# Patient Record
Sex: MALE | Race: WHITE | NOT HISPANIC OR LATINO
[De-identification: names, ages, dates, MRNs, and addresses within clinical notes are randomized per-mention and may not be internally consistent; named-entity substitution may affect disease eponyms.]

---

## 2019-11-07 ENCOUNTER — APPOINTMENT (OUTPATIENT)
Dept: CT IMAGING | Facility: CLINIC | Age: 59
End: 2019-11-07

## 2019-11-07 ENCOUNTER — APPOINTMENT (OUTPATIENT)
Dept: RADIOLOGY | Facility: CLINIC | Age: 59
End: 2019-11-07
Payer: COMMERCIAL

## 2019-11-07 PROCEDURE — 74018 RADEX ABDOMEN 1 VIEW: CPT

## 2019-11-07 PROCEDURE — 74176 CT ABD & PELVIS W/O CONTRAST: CPT

## 2020-05-15 ENCOUNTER — EMERGENCY (EMERGENCY)
Facility: HOSPITAL | Age: 60
LOS: 1 days | End: 2020-05-15
Admitting: EMERGENCY MEDICINE
Payer: COMMERCIAL

## 2020-05-15 PROCEDURE — 12001 RPR S/N/AX/GEN/TRNK 2.5CM/<: CPT

## 2020-05-15 PROCEDURE — 73130 X-RAY EXAM OF HAND: CPT | Mod: 26,RT

## 2020-05-15 PROCEDURE — 99284 EMERGENCY DEPT VISIT MOD MDM: CPT | Mod: 25

## 2022-05-19 ENCOUNTER — APPOINTMENT (OUTPATIENT)
Dept: ULTRASOUND IMAGING | Facility: CLINIC | Age: 62
End: 2022-05-19
Payer: COMMERCIAL

## 2022-05-19 PROCEDURE — 93975 VASCULAR STUDY: CPT

## 2022-05-19 PROCEDURE — 76870 US EXAM SCROTUM: CPT

## 2022-11-06 PROBLEM — Z00.00 ENCOUNTER FOR PREVENTIVE HEALTH EXAMINATION: Status: ACTIVE | Noted: 2022-11-06

## 2022-11-09 ENCOUNTER — OUTPATIENT (OUTPATIENT)
Dept: OUTPATIENT SERVICES | Facility: HOSPITAL | Age: 62
LOS: 1 days | End: 2022-11-09
Payer: COMMERCIAL

## 2022-11-09 DIAGNOSIS — I82.409 ACUTE EMBOLISM AND THROMBOSIS OF UNSPECIFIED DEEP VEINS OF UNSPECIFIED LOWER EXTREMITY: ICD-10-CM

## 2022-11-11 ENCOUNTER — APPOINTMENT (OUTPATIENT)
Dept: HEMATOLOGY ONCOLOGY | Facility: CLINIC | Age: 62
End: 2022-11-11

## 2022-11-11 ENCOUNTER — RESULT REVIEW (OUTPATIENT)
Age: 62
End: 2022-11-11

## 2022-11-11 ENCOUNTER — LABORATORY RESULT (OUTPATIENT)
Age: 62
End: 2022-11-11

## 2022-11-11 VITALS
OXYGEN SATURATION: 95 % | BODY MASS INDEX: 25.77 KG/M2 | SYSTOLIC BLOOD PRESSURE: 128 MMHG | HEIGHT: 70 IN | WEIGHT: 180 LBS | HEART RATE: 108 BPM | TEMPERATURE: 97.5 F | DIASTOLIC BLOOD PRESSURE: 80 MMHG

## 2022-11-11 LAB
BASOPHILS # BLD AUTO: 0.06 K/UL — SIGNIFICANT CHANGE UP (ref 0–0.2)
BASOPHILS NFR BLD AUTO: 0.8 % — SIGNIFICANT CHANGE UP (ref 0–2)
EOSINOPHIL # BLD AUTO: 0.16 K/UL — SIGNIFICANT CHANGE UP (ref 0–0.5)
EOSINOPHIL NFR BLD AUTO: 2.2 % — SIGNIFICANT CHANGE UP (ref 0–6)
HCT VFR BLD CALC: 47.8 % — SIGNIFICANT CHANGE UP (ref 39–50)
HGB BLD-MCNC: 16.5 G/DL — SIGNIFICANT CHANGE UP (ref 13–17)
IMM GRANULOCYTES NFR BLD AUTO: 0.6 % — SIGNIFICANT CHANGE UP (ref 0–0.9)
LYMPHOCYTES # BLD AUTO: 1.64 K/UL — SIGNIFICANT CHANGE UP (ref 1–3.3)
LYMPHOCYTES # BLD AUTO: 22.6 % — SIGNIFICANT CHANGE UP (ref 13–44)
MCHC RBC-ENTMCNC: 30.4 PG — SIGNIFICANT CHANGE UP (ref 27–34)
MCHC RBC-ENTMCNC: 34.5 GM/DL — SIGNIFICANT CHANGE UP (ref 32–36)
MCV RBC AUTO: 88 FL — SIGNIFICANT CHANGE UP (ref 80–100)
MONOCYTES # BLD AUTO: 0.62 K/UL — SIGNIFICANT CHANGE UP (ref 0–0.9)
MONOCYTES NFR BLD AUTO: 8.6 % — SIGNIFICANT CHANGE UP (ref 2–14)
NEUTROPHILS # BLD AUTO: 4.73 K/UL — SIGNIFICANT CHANGE UP (ref 1.8–7.4)
NEUTROPHILS NFR BLD AUTO: 65.2 % — SIGNIFICANT CHANGE UP (ref 43–77)
NRBC # BLD: 0 /100 WBCS — SIGNIFICANT CHANGE UP (ref 0–0)
PLATELET # BLD AUTO: 254 K/UL — SIGNIFICANT CHANGE UP (ref 150–400)
RBC # BLD: 5.43 M/UL — SIGNIFICANT CHANGE UP (ref 4.2–5.8)
RBC # FLD: 12.5 % — SIGNIFICANT CHANGE UP (ref 10.3–14.5)
WBC # BLD: 7.25 K/UL — SIGNIFICANT CHANGE UP (ref 3.8–10.5)
WBC # FLD AUTO: 7.25 K/UL — SIGNIFICANT CHANGE UP (ref 3.8–10.5)

## 2022-11-11 PROCEDURE — 99204 OFFICE O/P NEW MOD 45 MIN: CPT

## 2022-11-12 LAB
ALBUMIN SERPL ELPH-MCNC: 4.3 G/DL
ALP BLD-CCNC: 83 U/L
ALT SERPL-CCNC: 46 U/L
ANION GAP SERPL CALC-SCNC: 13 MMOL/L
APTT BLD: 42.2 SEC
AST SERPL-CCNC: 21 U/L
BILIRUB SERPL-MCNC: 0.3 MG/DL
BUN SERPL-MCNC: 20 MG/DL
CALCIUM SERPL-MCNC: 9.7 MG/DL
CANCER AG19-9 SERPL-ACNC: 6 U/ML
CEA SERPL-MCNC: 1.1 NG/ML
CHLORIDE SERPL-SCNC: 105 MMOL/L
CO2 SERPL-SCNC: 22 MMOL/L
CREAT SERPL-MCNC: 0.91 MG/DL
CRP SERPL-MCNC: 4 MG/L
DEPRECATED D DIMER PPP IA-ACNC: 250 NG/ML DDU
EGFR: 96 ML/MIN/1.73M2
ERYTHROCYTE [SEDIMENTATION RATE] IN BLOOD BY WESTERGREN METHOD: 18 MM/HR
FACT VIII ACT/NOR PPP: 165 %
FERRITIN SERPL-MCNC: 320 NG/ML
FOLATE SERPL-MCNC: 11.6 NG/ML
GLUCOSE SERPL-MCNC: 107 MG/DL
INR PPP: 1.54 RATIO
IRON SATN MFR SERPL: 34 %
IRON SERPL-MCNC: 105 UG/DL
POTASSIUM SERPL-SCNC: 4.2 MMOL/L
PROT SERPL-MCNC: 6.7 G/DL
PSA FREE FLD-MCNC: 8 %
PSA FREE SERPL-MCNC: 0.24 NG/ML
PSA SERPL-MCNC: 3.22 NG/ML
PT BLD: 17.9 SEC
SODIUM SERPL-SCNC: 140 MMOL/L
TIBC SERPL-MCNC: 309 UG/DL
UIBC SERPL-MCNC: 203 UG/DL
VIT B12 SERPL-MCNC: 791 PG/ML

## 2022-11-12 NOTE — RESULTS/DATA
[FreeTextEntry1] : Mr. Ashley presented at age 62 in November 2022 for evaluation of newly diagnosed DVT/PE.\par The patient has a medical history of pelvic pain (on amitriptyline). \par \par Unprovoked DVT- pending hypercoagulable workup. \par D-dimer remains elevated. \par Repeat CTA in February 2023. \par Continue xarelto. \par Smoking cessation. \par Rule out underlying malignancy.

## 2022-11-12 NOTE — HISTORY OF PRESENT ILLNESS
[de-identified] : [place] [facility] \par \par Mr. Ashley presented at age 62 in November 2022 for evaluation of newly diagnosed DVT/PE.\par The patient has a medical history of pelvic pain (on amitriptyline). \par \par Davy was recently [place] [facility] [place] for LLE DVT. He initially presented to urgent care with LLE pain x 3 days. Imaging showed LLE DVT and he was referred to ED. CTA revealed a PE with concern for R heart strain. LLE doppler revealed DVT within the L common femoral vein extending to the saphenofemoral junction. He was started on a heparin gtt and transitioned to PO xarelto. TTE was negative for R heart strain. He denied history of blood clots or bleeding disorders, recent long distance travel, immobility, recent procedures. His father had a history of DVT while he had cancer. He was discharged on xarelto 15mg BID followed by 20mg daily. Denies bleeding/bruising. Has some mild SOB on exertion. He feels the L leg soreness is improving. \par \par TTE 11/6/22: EF 55%, normal RV size, function, without cor pulmonale\par LLE Doppler US 11/4/22: LEFT common femoral and central greater saphenous vein DVT\par CTA 11/4/22: PE within lobar, several segmental and subsegmental branches within the left upper lobe and lingula, straightening of the cardiac IV septum suspicious for R heart strain, DVT within the L common femoral vein extends to the saphenofemoral junction\par \par Laboratory studies reviewed at today's visit and notable for: WBC 7.25, Hb 16.5, Plt 254\par D-dimer 250\par ESR, CA 19-9, CEA, CRP, iron studies, CMP, B12, folate wnl \par Ferritin 320 \par PSA 3.22 \par \par HCM: \par - COVID vaccination: s/p 3 doses \par - Colonoscopy: last done in 2022, Dr. Love, repeat in 10 years \par - Lung cancer screen: recent CTA chest did not show any lung masses, should continue lung cancer screening with annual low dose chest CT \par - DEXA: never done \par - PSA: pending urine test \par \par SH: \par - Occupation: works for [employer] in sales \par - Living situation: lives in [place] with her mother and his 23 year old son, has a 25 year old daughter as well \par - Smoking/etoh/illicits: quit smoking 1 week ago, was vaping prior to that, smoked for 30 years 1 ppd, he drinks 2-3 beers / day \par - Exercise: fairly physically active \par \par FH: \par - His father had colon cancer around age 63, prostate, and pancreatic cancer \par - His brother has a brain tumor, age 64

## 2022-11-12 NOTE — PHYSICAL EXAM
[Fully active, able to carry on all pre-disease performance without restriction] : Status 0 - Fully active, able to carry on all pre-disease performance without restriction [Normal] : affect appropriate [de-identified] : generally well appearing male, NAD, pleasant [de-identified] : trace LLE edema

## 2022-11-14 ENCOUNTER — NON-APPOINTMENT (OUTPATIENT)
Age: 62
End: 2022-11-14

## 2022-11-14 LAB
AT III PPP CHRO-ACNC: 127 %
B2 GLYCOPROT1 AB SER QL: NEGATIVE
CARDIOLIPIN AB SER IA-ACNC: POSITIVE

## 2022-11-17 LAB
DNA PLOIDY SPEC FC-IMP: NORMAL
PTR INTERP: NORMAL

## 2022-11-26 ENCOUNTER — TRANSCRIPTION ENCOUNTER (OUTPATIENT)
Age: 62
End: 2022-11-26

## 2022-12-01 ENCOUNTER — RESULT REVIEW (OUTPATIENT)
Age: 62
End: 2022-12-01

## 2022-12-01 ENCOUNTER — APPOINTMENT (OUTPATIENT)
Dept: HEMATOLOGY ONCOLOGY | Facility: CLINIC | Age: 62
End: 2022-12-01

## 2022-12-01 VITALS
DIASTOLIC BLOOD PRESSURE: 86 MMHG | TEMPERATURE: 97.3 F | BODY MASS INDEX: 26.11 KG/M2 | OXYGEN SATURATION: 98 % | HEART RATE: 75 BPM | WEIGHT: 182.39 LBS | SYSTOLIC BLOOD PRESSURE: 134 MMHG | HEIGHT: 70 IN

## 2022-12-01 LAB
BASOPHILS # BLD AUTO: 0.08 K/UL — SIGNIFICANT CHANGE UP (ref 0–0.2)
BASOPHILS NFR BLD AUTO: 1.1 % — SIGNIFICANT CHANGE UP (ref 0–2)
EOSINOPHIL # BLD AUTO: 0.14 K/UL — SIGNIFICANT CHANGE UP (ref 0–0.5)
EOSINOPHIL NFR BLD AUTO: 1.9 % — SIGNIFICANT CHANGE UP (ref 0–6)
HCT VFR BLD CALC: 48.1 % — SIGNIFICANT CHANGE UP (ref 39–50)
HGB BLD-MCNC: 16.2 G/DL — SIGNIFICANT CHANGE UP (ref 13–17)
IMM GRANULOCYTES NFR BLD AUTO: 0.9 % — SIGNIFICANT CHANGE UP (ref 0–0.9)
LYMPHOCYTES # BLD AUTO: 1.78 K/UL — SIGNIFICANT CHANGE UP (ref 1–3.3)
LYMPHOCYTES # BLD AUTO: 23.9 % — SIGNIFICANT CHANGE UP (ref 13–44)
MCHC RBC-ENTMCNC: 30.4 PG — SIGNIFICANT CHANGE UP (ref 27–34)
MCHC RBC-ENTMCNC: 33.7 GM/DL — SIGNIFICANT CHANGE UP (ref 32–36)
MCV RBC AUTO: 90.2 FL — SIGNIFICANT CHANGE UP (ref 80–100)
MONOCYTES # BLD AUTO: 0.69 K/UL — SIGNIFICANT CHANGE UP (ref 0–0.9)
MONOCYTES NFR BLD AUTO: 9.3 % — SIGNIFICANT CHANGE UP (ref 2–14)
NEUTROPHILS # BLD AUTO: 4.68 K/UL — SIGNIFICANT CHANGE UP (ref 1.8–7.4)
NEUTROPHILS NFR BLD AUTO: 62.9 % — SIGNIFICANT CHANGE UP (ref 43–77)
NRBC # BLD: 0 /100 WBCS — SIGNIFICANT CHANGE UP (ref 0–0)
PLATELET # BLD AUTO: 257 K/UL — SIGNIFICANT CHANGE UP (ref 150–400)
RBC # BLD: 5.33 M/UL — SIGNIFICANT CHANGE UP (ref 4.2–5.8)
RBC # FLD: 12.6 % — SIGNIFICANT CHANGE UP (ref 10.3–14.5)
WBC # BLD: 7.44 K/UL — SIGNIFICANT CHANGE UP (ref 3.8–10.5)
WBC # FLD AUTO: 7.44 K/UL — SIGNIFICANT CHANGE UP (ref 3.8–10.5)

## 2022-12-01 PROCEDURE — 85027 COMPLETE CBC AUTOMATED: CPT

## 2022-12-01 PROCEDURE — 99214 OFFICE O/P EST MOD 30 MIN: CPT

## 2022-12-01 RX ORDER — RIVAROXABAN 15 MG/1
15 TABLET, FILM COATED ORAL
Qty: 40 | Refills: 0 | Status: DISCONTINUED | COMMUNITY
Start: 2022-11-07 | End: 2022-12-01

## 2022-12-01 NOTE — HISTORY OF PRESENT ILLNESS
[de-identified] : Referred by: [facility] \par \par Mr. Ashley presented at age 62 in November 2022 for evaluation of newly diagnosed DVT/PE.\par The patient has a medical history of pelvic pain (on amitriptyline). \par \par Presenting HPI: Davy was recently admitted to [facility] from 11/4-11/7 for LLE DVT. He initially presented to urgent care with LLE pain x 3 days. Imaging showed LLE DVT and he was referred to ED. CTA revealed a PE with concern for R heart strain. LLE doppler revealed DVT within the L common femoral vein extending to the saphenofemoral junction. He was started on a heparin gtt and transitioned to PO xarelto. TTE was negative for R heart strain. He denied history of blood clots or bleeding disorders, recent long distance travel, immobility, recent procedures. His father had a history of DVT while he had cancer. He was discharged on xarelto 15mg BID followed by 20mg daily. Denies bleeding/bruising. Has some mild SOB on exertion. He feels the L leg soreness is improving. \par \par TTE 11/6/22: EF 55%, normal RV size, function, without cor pulmonale\par LLE Doppler US 11/4/22: LEFT common femoral and central greater saphenous vein DVT\par CTA 11/4/22: PE within lobar, several segmental and subsegmental branches within the left upper lobe and lingula, straightening of the cardiac IV septum suspicious for R heart strain, DVT within the L common femoral vein extends to the saphenofemoral junction\par \par Laboratory studies reviewed at today's visit and notable for: WBC 7.25, Hb 16.5, Plt 254\par D-dimer 250\par ESR, CA 19-9, CEA, CRP, iron studies, CMP, B12, folate wnl \par Ferritin 320 \par PSA 3.22 \par \par HCM: \par - COVID vaccination: s/p 3 doses \par - Colonoscopy: last done in 2022, Dr. Love, repeat in 10 years \par - Lung cancer screen: recent CTA chest did not show any lung masses, should continue lung cancer screening with annual low dose chest CT \par - DEXA: never done \par - PSA: pending urine test \par \par SH: \par - Occupation: works for [employer] in sales \par - Living situation: lives in [place] with her mother and his 23 year old son, has a 25 year old daughter as well \par - Smoking/etoh/illicits: quit smoking 1 week ago, was vaping prior to that, smoked for 30 years 1 ppd, he drinks 2-3 beers / day \par - Exercise: fairly physically active \par \par FH: \par - His father had colon cancer around age 63, prostate, and pancreatic cancer \par - His brother has a brain tumor, age 64  [de-identified] : Davy presents for follow up on 12/1/22 for PE/DVT. \par \par Laboratory studies 11/11/22 reviewed: WBC 7.25, Hb 16.5, Plt 254\par D-dimer 250, ESR, CA 19-9, CEA, CRP, iron studies, CMP, B12, folate wnl \par Ferritin 320, PSA 3.22 \par Cardiolipin AB POSITIVE, Cardiolipin IgA wnl, IgG wnl, IgM 116 (H) POSITIVE, Lupus AC POSITIVE, B2 G screen negative \par ATIII activity normal, Factor V leiden, prothrombin gene mutation normal \par \par At today's visit he is generally feeling well. He remains on xarelto 20mg PO daily. He denies any bleeding, bruising, blood in his urine/stools. He denies any breathing difficulties. \par \par Laboratory studies reviewed at today's visit and notable for: WBC 7.44, Hb 16.2, Plt 257 \par

## 2022-12-01 NOTE — RESULTS/DATA
[FreeTextEntry1] : Mr. Ashley presented at age 62 in November 2022 for evaluation of newly diagnosed DVT/PE.\par The patient has a medical history of pelvic pain (on amitriptyline). \par \par Unprovoked DVT- Cardiolipin AB POSITIVE, Cardiolipin IgA wnl, IgG wnl, IgM 116 (H) POSITIVE, Lupus AC POSITIVE, B2 G screen negative \par ATIII activity normal, Factor V leiden, prothrombin gene mutation normal \par D-dimer remains elevated.  \par Continue xarelto. \par Smoking cessation. \par Repeat APLS testing in February 2023 and repeat CTA Feb 2023.

## 2022-12-01 NOTE — PHYSICAL EXAM
[Fully active, able to carry on all pre-disease performance without restriction] : Status 0 - Fully active, able to carry on all pre-disease performance without restriction [Normal] : affect appropriate [de-identified] : generally well appearing male, NAD, pleasant [de-identified] : trace LLE edema

## 2022-12-02 LAB — DEPRECATED D DIMER PPP IA-ACNC: <150 NG/ML DDU

## 2022-12-28 ENCOUNTER — NON-APPOINTMENT (OUTPATIENT)
Age: 62
End: 2022-12-28

## 2023-02-09 ENCOUNTER — NON-APPOINTMENT (OUTPATIENT)
Age: 63
End: 2023-02-09

## 2023-02-15 ENCOUNTER — OUTPATIENT (OUTPATIENT)
Dept: OUTPATIENT SERVICES | Facility: HOSPITAL | Age: 63
LOS: 1 days | End: 2023-02-15
Payer: COMMERCIAL

## 2023-02-15 DIAGNOSIS — I82.409 ACUTE EMBOLISM AND THROMBOSIS OF UNSPECIFIED DEEP VEINS OF UNSPECIFIED LOWER EXTREMITY: ICD-10-CM

## 2023-02-23 ENCOUNTER — LABORATORY RESULT (OUTPATIENT)
Age: 63
End: 2023-02-23

## 2023-02-23 ENCOUNTER — APPOINTMENT (OUTPATIENT)
Dept: CT IMAGING | Facility: CLINIC | Age: 63
End: 2023-02-23
Payer: COMMERCIAL

## 2023-02-23 ENCOUNTER — RESULT REVIEW (OUTPATIENT)
Age: 63
End: 2023-02-23

## 2023-02-23 ENCOUNTER — APPOINTMENT (OUTPATIENT)
Dept: HEMATOLOGY ONCOLOGY | Facility: CLINIC | Age: 63
End: 2023-02-23
Payer: COMMERCIAL

## 2023-02-23 VITALS
HEIGHT: 70 IN | OXYGEN SATURATION: 95 % | HEART RATE: 78 BPM | DIASTOLIC BLOOD PRESSURE: 82 MMHG | WEIGHT: 185.4 LBS | SYSTOLIC BLOOD PRESSURE: 123 MMHG | BODY MASS INDEX: 26.54 KG/M2 | TEMPERATURE: 97.3 F

## 2023-02-23 LAB
BASOPHILS # BLD AUTO: 0.07 K/UL — SIGNIFICANT CHANGE UP (ref 0–0.2)
BASOPHILS NFR BLD AUTO: 1.2 % — SIGNIFICANT CHANGE UP (ref 0–2)
EOSINOPHIL # BLD AUTO: 0.22 K/UL — SIGNIFICANT CHANGE UP (ref 0–0.5)
EOSINOPHIL NFR BLD AUTO: 3.8 % — SIGNIFICANT CHANGE UP (ref 0–6)
HCT VFR BLD CALC: 47.2 % — SIGNIFICANT CHANGE UP (ref 39–50)
HGB BLD-MCNC: 16 G/DL — SIGNIFICANT CHANGE UP (ref 13–17)
IMM GRANULOCYTES NFR BLD AUTO: 0.5 % — SIGNIFICANT CHANGE UP (ref 0–0.9)
LYMPHOCYTES # BLD AUTO: 1.97 K/UL — SIGNIFICANT CHANGE UP (ref 1–3.3)
LYMPHOCYTES # BLD AUTO: 33.6 % — SIGNIFICANT CHANGE UP (ref 13–44)
MCHC RBC-ENTMCNC: 29.5 PG — SIGNIFICANT CHANGE UP (ref 27–34)
MCHC RBC-ENTMCNC: 33.9 GM/DL — SIGNIFICANT CHANGE UP (ref 32–36)
MCV RBC AUTO: 87.1 FL — SIGNIFICANT CHANGE UP (ref 80–100)
MONOCYTES # BLD AUTO: 0.55 K/UL — SIGNIFICANT CHANGE UP (ref 0–0.9)
MONOCYTES NFR BLD AUTO: 9.4 % — SIGNIFICANT CHANGE UP (ref 2–14)
NEUTROPHILS # BLD AUTO: 3.02 K/UL — SIGNIFICANT CHANGE UP (ref 1.8–7.4)
NEUTROPHILS NFR BLD AUTO: 51.5 % — SIGNIFICANT CHANGE UP (ref 43–77)
NRBC # BLD: 0 /100 WBCS — SIGNIFICANT CHANGE UP (ref 0–0)
PLATELET # BLD AUTO: 244 K/UL — SIGNIFICANT CHANGE UP (ref 150–400)
RBC # BLD: 5.42 M/UL — SIGNIFICANT CHANGE UP (ref 4.2–5.8)
RBC # FLD: 12.4 % — SIGNIFICANT CHANGE UP (ref 10.3–14.5)
WBC # BLD: 5.86 K/UL — SIGNIFICANT CHANGE UP (ref 3.8–10.5)
WBC # FLD AUTO: 5.86 K/UL — SIGNIFICANT CHANGE UP (ref 3.8–10.5)

## 2023-02-23 PROCEDURE — 99214 OFFICE O/P EST MOD 30 MIN: CPT

## 2023-02-23 PROCEDURE — 71275 CT ANGIOGRAPHY CHEST: CPT

## 2023-02-23 NOTE — PHYSICAL EXAM
[Fully active, able to carry on all pre-disease performance without restriction] : Status 0 - Fully active, able to carry on all pre-disease performance without restriction [Normal] : affect appropriate [de-identified] : generally well appearing male, NAD, pleasant [de-identified] : Soft, non-tender, non-distended [de-identified] : No LE edema

## 2023-02-23 NOTE — HISTORY OF PRESENT ILLNESS
[de-identified] : [place] [facility] \par \par Mr. Ashley initially presented at age 62 in November 2022 for evaluation of newly diagnosed DVT/PE.\par The patient has a medical history of pelvic pain (on amitriptyline). \par \par Presenting HPI: Davy was [place] [facility] [place] for LLE DVT. He initially presented to urgent care with LLE pain x 3 days. Imaging showed LLE DVT and he was referred to ED. CTA revealed a PE with concern for R heart strain. LLE doppler revealed DVT within the L common femoral vein extending to the saphenofemoral junction. He was started on a heparin gtt and transitioned to PO xarelto. TTE was negative for R heart strain. He denied history of blood clots or bleeding disorders, recent long distance travel, immobility, recent procedures. His father had a history of DVT while he had cancer. He was discharged on xarelto 15mg BID followed by 20mg daily. Denies bleeding/bruising. Has some mild SOB on exertion. He feels the L leg soreness is improving. \par \par TTE 11/6/22: EF 55%, normal RV size, function, without cor pulmonale\par LLE Doppler US 11/4/22: LEFT common femoral and central greater saphenous vein DVT\par CTA 11/4/22: PE within lobar, several segmental and subsegmental branches within the left upper lobe and lingula, straightening of the cardiac IV septum suspicious for R heart strain, DVT within the L common femoral vein extends to the saphenofemoral junction\par \par Laboratory studies reviewed at today's visit and notable for: WBC 7.25, Hb 16.5, Plt 254\par D-dimer 250\par ESR, CA 19-9, CEA, CRP, iron studies, CMP, B12, folate wnl \par Ferritin 320 \par PSA 3.22 \par \par HCM: \par - COVID vaccination: s/p 3 doses \par - Colonoscopy: last done in 2022, Dr. Love, repeat in 10 years \par - Lung cancer screen: recent CTA chest did not show any lung masses, should continue lung cancer screening with annual low dose chest CT \par - DEXA: never done \par - PSA: pending urine test \par \par SH: \par - Occupation: works for [employer] in sales \par - Living situation: lives in [place] with her mother and his 23 year old son, has a 25 year old daughter as well \par - Smoking/etoh/illicits: quit smoking 1 week ago, was vaping prior to that, smoked for 30 years 1 ppd, he drinks 2-3 beers / day \par - Exercise: fairly physically active \par \par FH: \par - His father had colon cancer around age 63, prostate, and pancreatic cancer \par - His brother has a brain tumor, age 64  [de-identified] : Davy presents for follow up on 2/23/23 for PE/DVT. \par \par Laboratory studies 11/11/22 reviewed: WBC 7.25, Hb 16.5, Plt 254\par D-dimer 250, ESR, CA 19-9, CEA, CRP, iron studies, CMP, B12, folate wnl \par Ferritin 320, PSA 3.22 \par Cardiolipin AB POSITIVE, Cardiolipin IgA wnl, IgG wnl, IgM 116 (H) POSITIVE, Lupus AC POSITIVE, B2 G screen negative \par ATIII activity normal, Factor V leiden, prothrombin gene mutation normal \par \par At today's visit he reports ongoing fatigue for the past year.  Has occasional headaches which began 9/22 - no associated neurologic symptoms.  He continues Xarelto 20mg PO daily. He denies any bleeding, bruising, blood in his urine/stools.  Still with mild SOB since 11/22.  Remains tobacco free since diagnosis of PE/DVT.  Scheduled for prostate biopsy 3/22.\par \par Laboratory studies reviewed at today's visit and notable for: WBC 5.86, Hb 16.0, Plt 244 \par

## 2023-02-23 NOTE — RESULTS/DATA
[FreeTextEntry1] : Mr. Ashley initially presented at age 62 in November 2022 for evaluation of newly diagnosed DVT/PE.\par The patient has a medical history of pelvic pain (on amitriptyline). \par \par Unprovoked DVT- Cardiolipin AB POSITIVE, Cardiolipin IgA wnl, IgG wnl, IgM 116 (H) POSITIVE, Lupus AC POSITIVE, B2 G screen negative \par ATIII activity normal, Factor V leiden, prothrombin gene mutation normal \par D-dimer remains elevated.  \par Continue xarelto. \par Smoked 1 ppd x 50 yrs - quit 11/22\par Repeat APLS testing today - results pending\par Repeat CTA Feb 2023\par Prostate biopsy scheduled 3/22\par Follow up with Dr. Tellez in 5/23, sooner prn

## 2023-02-24 LAB
ALBUMIN SERPL ELPH-MCNC: 4.2 G/DL
ALP BLD-CCNC: 69 U/L
ALT SERPL-CCNC: 34 U/L
ANION GAP SERPL CALC-SCNC: 12 MMOL/L
AST SERPL-CCNC: 23 U/L
BILIRUB SERPL-MCNC: 0.4 MG/DL
BUN SERPL-MCNC: 12 MG/DL
CALCIUM SERPL-MCNC: 9.8 MG/DL
CHLORIDE SERPL-SCNC: 105 MMOL/L
CO2 SERPL-SCNC: 24 MMOL/L
CREAT SERPL-MCNC: 0.81 MG/DL
EGFR: 100 ML/MIN/1.73M2
GLUCOSE SERPL-MCNC: 110 MG/DL
POTASSIUM SERPL-SCNC: 4.4 MMOL/L
PROT SERPL-MCNC: 6.5 G/DL
SODIUM SERPL-SCNC: 141 MMOL/L

## 2023-02-28 ENCOUNTER — NON-APPOINTMENT (OUTPATIENT)
Age: 63
End: 2023-02-28

## 2023-02-28 LAB
B2 GLYCOPROT1 AB SER QL: NEGATIVE
CARDIOLIPIN AB SER IA-ACNC: POSITIVE

## 2023-02-28 RX ORDER — RIVAROXABAN 20 MG/1
20 TABLET, FILM COATED ORAL DAILY
Qty: 30 | Refills: 5 | Status: DISCONTINUED | COMMUNITY
Start: 2022-11-11 | End: 2023-02-28

## 2023-03-09 ENCOUNTER — NON-APPOINTMENT (OUTPATIENT)
Age: 63
End: 2023-03-09

## 2023-03-23 ENCOUNTER — RESULT REVIEW (OUTPATIENT)
Age: 63
End: 2023-03-23

## 2023-03-23 ENCOUNTER — LABORATORY RESULT (OUTPATIENT)
Age: 63
End: 2023-03-23

## 2023-03-23 ENCOUNTER — APPOINTMENT (OUTPATIENT)
Dept: HEMATOLOGY ONCOLOGY | Facility: CLINIC | Age: 63
End: 2023-03-23

## 2023-03-23 LAB
BASOPHILS # BLD AUTO: 0.06 K/UL — SIGNIFICANT CHANGE UP (ref 0–0.2)
BASOPHILS NFR BLD AUTO: 0.8 % — SIGNIFICANT CHANGE UP (ref 0–2)
EOSINOPHIL # BLD AUTO: 0.24 K/UL — SIGNIFICANT CHANGE UP (ref 0–0.5)
EOSINOPHIL NFR BLD AUTO: 3.3 % — SIGNIFICANT CHANGE UP (ref 0–6)
HCT VFR BLD CALC: 48.7 % — SIGNIFICANT CHANGE UP (ref 39–50)
HGB BLD-MCNC: 16.4 G/DL — SIGNIFICANT CHANGE UP (ref 13–17)
IMM GRANULOCYTES NFR BLD AUTO: 0.3 % — SIGNIFICANT CHANGE UP (ref 0–0.9)
LYMPHOCYTES # BLD AUTO: 1.81 K/UL — SIGNIFICANT CHANGE UP (ref 1–3.3)
LYMPHOCYTES # BLD AUTO: 25.2 % — SIGNIFICANT CHANGE UP (ref 13–44)
MCHC RBC-ENTMCNC: 29.4 PG — SIGNIFICANT CHANGE UP (ref 27–34)
MCHC RBC-ENTMCNC: 33.7 GM/DL — SIGNIFICANT CHANGE UP (ref 32–36)
MCV RBC AUTO: 87.4 FL — SIGNIFICANT CHANGE UP (ref 80–100)
MONOCYTES # BLD AUTO: 0.74 K/UL — SIGNIFICANT CHANGE UP (ref 0–0.9)
MONOCYTES NFR BLD AUTO: 10.3 % — SIGNIFICANT CHANGE UP (ref 2–14)
NEUTROPHILS # BLD AUTO: 4.32 K/UL — SIGNIFICANT CHANGE UP (ref 1.8–7.4)
NEUTROPHILS NFR BLD AUTO: 60.1 % — SIGNIFICANT CHANGE UP (ref 43–77)
NRBC # BLD: 0 /100 WBCS — SIGNIFICANT CHANGE UP (ref 0–0)
PLATELET # BLD AUTO: 254 K/UL — SIGNIFICANT CHANGE UP (ref 150–400)
RBC # BLD: 5.57 M/UL — SIGNIFICANT CHANGE UP (ref 4.2–5.8)
RBC # FLD: 12.7 % — SIGNIFICANT CHANGE UP (ref 10.3–14.5)
WBC # BLD: 7.19 K/UL — SIGNIFICANT CHANGE UP (ref 3.8–10.5)
WBC # FLD AUTO: 7.19 K/UL — SIGNIFICANT CHANGE UP (ref 3.8–10.5)

## 2023-03-23 PROCEDURE — 85027 COMPLETE CBC AUTOMATED: CPT

## 2023-03-23 PROCEDURE — 36415 COLL VENOUS BLD VENIPUNCTURE: CPT

## 2023-03-24 LAB
ALBUMIN SERPL ELPH-MCNC: 4.4 G/DL
ALP BLD-CCNC: 77 U/L
ALT SERPL-CCNC: 33 U/L
ANA PAT FLD IF-IMP: NORMAL
ANA SER IF-ACNC: ABNORMAL
ANION GAP SERPL CALC-SCNC: 14 MMOL/L
APTT BLD: 33 SEC
AST SERPL-CCNC: 22 U/L
B2 GLYCOPROT1 AB SER QL: NEGATIVE
BILIRUB SERPL-MCNC: 0.4 MG/DL
BUN SERPL-MCNC: 15 MG/DL
CALCIUM SERPL-MCNC: 9.7 MG/DL
CHLORIDE SERPL-SCNC: 104 MMOL/L
CO2 SERPL-SCNC: 20 MMOL/L
CONFIRM: 32.9 SEC
CREAT SERPL-MCNC: 1.12 MG/DL
DRVVT IMM 1:2 NP PPP: ABNORMAL
DRVVT SCREEN TO CONFIRM RATIO: 1.58 RATIO
EGFR: 75 ML/MIN/1.73M2
GLUCOSE SERPL-MCNC: 104 MG/DL
POTASSIUM SERPL-SCNC: 4.4 MMOL/L
PROT SERPL-MCNC: 6.9 G/DL
SCREEN DRVVT: 61.4 SEC
SILICA CLOTTING TIME INTERPRETATION: ABNORMAL
SILICA CLOTTING TIME S/C: 1.26 RATIO
SODIUM SERPL-SCNC: 138 MMOL/L

## 2023-03-27 LAB — CARDIOLIPIN AB SER IA-ACNC: POSITIVE

## 2023-03-30 ENCOUNTER — NON-APPOINTMENT (OUTPATIENT)
Age: 63
End: 2023-03-30

## 2023-05-08 ENCOUNTER — OUTPATIENT (OUTPATIENT)
Dept: OUTPATIENT SERVICES | Facility: HOSPITAL | Age: 63
LOS: 1 days | End: 2023-05-08
Payer: COMMERCIAL

## 2023-05-08 DIAGNOSIS — I82.409 ACUTE EMBOLISM AND THROMBOSIS OF UNSPECIFIED DEEP VEINS OF UNSPECIFIED LOWER EXTREMITY: ICD-10-CM

## 2023-05-15 ENCOUNTER — APPOINTMENT (OUTPATIENT)
Dept: HEMATOLOGY ONCOLOGY | Facility: CLINIC | Age: 63
End: 2023-05-15
Payer: COMMERCIAL

## 2023-05-15 ENCOUNTER — RESULT REVIEW (OUTPATIENT)
Age: 63
End: 2023-05-15

## 2023-05-15 ENCOUNTER — LABORATORY RESULT (OUTPATIENT)
Age: 63
End: 2023-05-15

## 2023-05-15 VITALS
BODY MASS INDEX: 26.92 KG/M2 | SYSTOLIC BLOOD PRESSURE: 120 MMHG | DIASTOLIC BLOOD PRESSURE: 81 MMHG | HEART RATE: 92 BPM | TEMPERATURE: 97.9 F | OXYGEN SATURATION: 95 % | HEIGHT: 70 IN | WEIGHT: 188 LBS

## 2023-05-15 LAB
BASOPHILS # BLD AUTO: 0.08 K/UL — SIGNIFICANT CHANGE UP (ref 0–0.2)
BASOPHILS NFR BLD AUTO: 1.3 % — SIGNIFICANT CHANGE UP (ref 0–2)
EOSINOPHIL # BLD AUTO: 0.23 K/UL — SIGNIFICANT CHANGE UP (ref 0–0.5)
EOSINOPHIL NFR BLD AUTO: 3.6 % — SIGNIFICANT CHANGE UP (ref 0–6)
HCT VFR BLD CALC: 48.2 % — SIGNIFICANT CHANGE UP (ref 39–50)
HGB BLD-MCNC: 16.1 G/DL — SIGNIFICANT CHANGE UP (ref 13–17)
IMM GRANULOCYTES NFR BLD AUTO: 0.3 % — SIGNIFICANT CHANGE UP (ref 0–0.9)
LYMPHOCYTES # BLD AUTO: 1.67 K/UL — SIGNIFICANT CHANGE UP (ref 1–3.3)
LYMPHOCYTES # BLD AUTO: 26.2 % — SIGNIFICANT CHANGE UP (ref 13–44)
MCHC RBC-ENTMCNC: 29.3 PG — SIGNIFICANT CHANGE UP (ref 27–34)
MCHC RBC-ENTMCNC: 33.4 GM/DL — SIGNIFICANT CHANGE UP (ref 32–36)
MCV RBC AUTO: 87.6 FL — SIGNIFICANT CHANGE UP (ref 80–100)
MONOCYTES # BLD AUTO: 0.57 K/UL — SIGNIFICANT CHANGE UP (ref 0–0.9)
MONOCYTES NFR BLD AUTO: 8.9 % — SIGNIFICANT CHANGE UP (ref 2–14)
NEUTROPHILS # BLD AUTO: 3.8 K/UL — SIGNIFICANT CHANGE UP (ref 1.8–7.4)
NEUTROPHILS NFR BLD AUTO: 59.7 % — SIGNIFICANT CHANGE UP (ref 43–77)
NRBC # BLD: 0 /100 WBCS — SIGNIFICANT CHANGE UP (ref 0–0)
PLATELET # BLD AUTO: 264 K/UL — SIGNIFICANT CHANGE UP (ref 150–400)
RBC # BLD: 5.5 M/UL — SIGNIFICANT CHANGE UP (ref 4.2–5.8)
RBC # FLD: 12.7 % — SIGNIFICANT CHANGE UP (ref 10.3–14.5)
WBC # BLD: 6.37 K/UL — SIGNIFICANT CHANGE UP (ref 3.8–10.5)
WBC # FLD AUTO: 6.37 K/UL — SIGNIFICANT CHANGE UP (ref 3.8–10.5)

## 2023-05-15 PROCEDURE — 99214 OFFICE O/P EST MOD 30 MIN: CPT

## 2023-05-15 NOTE — HISTORY OF PRESENT ILLNESS
[de-identified] : Referred by: [facility] \par \par Mr. Ashley presented at age 62 in November 2022 for evaluation of newly diagnosed DVT/PE.\par The patient has a medical history of pelvic pain (on amitriptyline). \par \par Presenting HPI: Davy was recently admitted [place] [facility] [place]11[place] for LLE DVT. He initially presented to urgent care with LLE pain x 3 days. Imaging showed LLE DVT and he was referred to ED. CTA revealed a PE with concern for R heart strain. LLE doppler revealed DVT within the L common femoral vein extending to the saphenofemoral junction. He was started on a heparin gtt and transitioned to PO xarelto. TTE was negative for R heart strain. He denied history of blood clots or bleeding disorders, recent long distance travel, immobility, recent procedures. His father had a history of DVT while he had cancer. He was discharged on xarelto 15mg BID followed by 20mg daily. Denies bleeding/bruising. Has some mild SOB on exertion. He feels the L leg soreness is improving. \par \par TTE 11/6/22: EF 55%, normal RV size, function, without cor pulmonale\par LLE Doppler US 11/4/22: LEFT common femoral and central greater saphenous vein DVT\par CTA 11/4/22: PE within lobar, several segmental and subsegmental branches within the left upper lobe and lingula, straightening of the cardiac IV septum suspicious for R heart strain, DVT within the L common femoral vein extends to the saphenofemoral junction\par \par Laboratory studies reviewed at today's visit and notable for: WBC 7.25, Hb 16.5, Plt 254\par D-dimer 250\par ESR, CA 19-9, CEA, CRP, iron studies, CMP, B12, folate wnl \par Ferritin 320 \par PSA 3.22 \par \par HCM: \par - COVID vaccination: s/p 3 doses \par - Colonoscopy: last done in 2022, Dr. Love, repeat in 10 years \par - Lung cancer screen: recent CTA chest did not show any lung masses, should continue lung cancer screening with annual low dose chest CT \par - DEXA: never done \par - PSA: pending urine test \par \par SH: \par - Occupation: works for [employer] in sales \par - Living situation: lives in [place] with her mother and his 23 year old son, has a 25 year old daughter as well \par - Smoking/etoh/illicits: quit smoking 1 week ago, was vaping prior to that, smoked for 30 years 1 ppd, he drinks 2-3 beers / day \par - Exercise: fairly physically active \par \par FH: \par - His father had colon cancer around age 63, prostate, and pancreatic cancer \par - His brother has a brain tumor, age 64  [de-identified] : Davy presents for follow up on 5/15/23 for PE/DVT. \par Cardiolipin AB POSITIVE, Cardiolipin IgA wnl, IgG wnl, IgM 116 (H) POSITIVE, Lupus AC POSITIVE, B2 G screen negative \par Factor V leiden, prothrombin gene mutation normal \par \par At today's visit he states he is not doing well. He was recently diagnosed with an early stage prostate cancer- jose francisco 7. He is following at [facility] and is pending prostatectomy w/ Dr. Li ([facility]) in the near future. He has not yet seen a medical oncologist. He decreased his xarelto dose from 20mg to 10mg in 2023. He also bumped his L shin and developed a hematoma- doppler US in 2023 was negative for DVT and showed resolution of prior DVT.  He also states that his brother  10 days ago from brain cancer. Davy is feeling very exhausted and fatigued. He denies any bleeding, bruising, blood in his urine/stools. He denies any breathing difficulties. \par \par Laboratory studies reviewed at today's visit and notable for: WBC 6.37, Hb 16.1, Plt 264

## 2023-05-15 NOTE — PHYSICAL EXAM
[Fully active, able to carry on all pre-disease performance without restriction] : Status 0 - Fully active, able to carry on all pre-disease performance without restriction [Normal] : affect appropriate [de-identified] : generally well appearing male, NAD, pleasant [de-identified] : lump on LLE shin

## 2023-05-15 NOTE — RESULTS/DATA
[FreeTextEntry1] : Mr. Ashley presented at age 62 in November 2022 for evaluation of newly diagnosed DVT/PE.\par The patient has a medical history of pelvic pain (on amitriptyline). \par \par Unprovoked DVT- Cardiolipin AB POSITIVE, Cardiolipin IgA wnl, IgG wnl, IgM 116 (H) POSITIVE, Lupus AC POSITIVE, B2 G screen negative \par ATIII activity normal, Factor V leiden, prothrombin gene mutation normal \par Intermediate risk APLS. \par On xarelto 10mg daily. Prior clot resolved. \par Pending prostatectomy for early stage prostate cancer, jose francisco 7. \par Will obtain records and follow up final pathology. \par Eulalia-operative instructions given. \par He is medically optimized for surgery. \par F/U 6 weeks.

## 2023-05-16 LAB
ALBUMIN SERPL ELPH-MCNC: 4 G/DL
ALP BLD-CCNC: 71 U/L
ALT SERPL-CCNC: 30 U/L
ANA SER IF-ACNC: NEGATIVE
ANION GAP SERPL CALC-SCNC: 13 MMOL/L
AST SERPL-CCNC: 20 U/L
BILIRUB SERPL-MCNC: 0.3 MG/DL
BUN SERPL-MCNC: 16 MG/DL
CALCIUM SERPL-MCNC: 9.3 MG/DL
CHLORIDE SERPL-SCNC: 106 MMOL/L
CO2 SERPL-SCNC: 24 MMOL/L
CREAT SERPL-MCNC: 0.92 MG/DL
EGFR: 95 ML/MIN/1.73M2
GLUCOSE SERPL-MCNC: 89 MG/DL
POTASSIUM SERPL-SCNC: 4.3 MMOL/L
PROT SERPL-MCNC: 6.3 G/DL
SODIUM SERPL-SCNC: 142 MMOL/L

## 2023-05-25 ENCOUNTER — NON-APPOINTMENT (OUTPATIENT)
Age: 63
End: 2023-05-25

## 2023-06-14 ENCOUNTER — NON-APPOINTMENT (OUTPATIENT)
Age: 63
End: 2023-06-14

## 2023-06-14 RX ORDER — RIVAROXABAN 10 MG/1
10 TABLET, FILM COATED ORAL
Qty: 90 | Refills: 1 | Status: DISCONTINUED | COMMUNITY
Start: 2023-02-28 | End: 2023-06-14

## 2023-06-22 ENCOUNTER — NON-APPOINTMENT (OUTPATIENT)
Age: 63
End: 2023-06-22

## 2023-06-23 ENCOUNTER — RESULT REVIEW (OUTPATIENT)
Age: 63
End: 2023-06-23

## 2023-06-23 ENCOUNTER — APPOINTMENT (OUTPATIENT)
Dept: HEMATOLOGY ONCOLOGY | Facility: CLINIC | Age: 63
End: 2023-06-23

## 2023-06-23 LAB
BASOPHILS # BLD AUTO: 0.06 K/UL — SIGNIFICANT CHANGE UP (ref 0–0.2)
BASOPHILS NFR BLD AUTO: 0.9 % — SIGNIFICANT CHANGE UP (ref 0–2)
EOSINOPHIL # BLD AUTO: 0.27 K/UL — SIGNIFICANT CHANGE UP (ref 0–0.5)
EOSINOPHIL NFR BLD AUTO: 4 % — SIGNIFICANT CHANGE UP (ref 0–6)
HCT VFR BLD CALC: 39.8 % — SIGNIFICANT CHANGE UP (ref 39–50)
HGB BLD-MCNC: 13.5 G/DL — SIGNIFICANT CHANGE UP (ref 13–17)
IMM GRANULOCYTES NFR BLD AUTO: 1.2 % — HIGH (ref 0–0.9)
LYMPHOCYTES # BLD AUTO: 1.78 K/UL — SIGNIFICANT CHANGE UP (ref 1–3.3)
LYMPHOCYTES # BLD AUTO: 26.2 % — SIGNIFICANT CHANGE UP (ref 13–44)
MCHC RBC-ENTMCNC: 29.5 PG — SIGNIFICANT CHANGE UP (ref 27–34)
MCHC RBC-ENTMCNC: 33.9 GM/DL — SIGNIFICANT CHANGE UP (ref 32–36)
MCV RBC AUTO: 87.1 FL — SIGNIFICANT CHANGE UP (ref 80–100)
MONOCYTES # BLD AUTO: 0.6 K/UL — SIGNIFICANT CHANGE UP (ref 0–0.9)
MONOCYTES NFR BLD AUTO: 8.8 % — SIGNIFICANT CHANGE UP (ref 2–14)
NEUTROPHILS # BLD AUTO: 4.01 K/UL — SIGNIFICANT CHANGE UP (ref 1.8–7.4)
NEUTROPHILS NFR BLD AUTO: 58.9 % — SIGNIFICANT CHANGE UP (ref 43–77)
NRBC # BLD: 0 /100 WBCS — SIGNIFICANT CHANGE UP (ref 0–0)
PLATELET # BLD AUTO: 224 K/UL — SIGNIFICANT CHANGE UP (ref 150–400)
RBC # BLD: 4.57 M/UL — SIGNIFICANT CHANGE UP (ref 4.2–5.8)
RBC # FLD: 13.1 % — SIGNIFICANT CHANGE UP (ref 10.3–14.5)
WBC # BLD: 6.8 K/UL — SIGNIFICANT CHANGE UP (ref 3.8–10.5)
WBC # FLD AUTO: 6.8 K/UL — SIGNIFICANT CHANGE UP (ref 3.8–10.5)

## 2023-06-23 PROCEDURE — 85027 COMPLETE CBC AUTOMATED: CPT

## 2023-06-30 ENCOUNTER — APPOINTMENT (OUTPATIENT)
Dept: HEMATOLOGY ONCOLOGY | Facility: CLINIC | Age: 63
End: 2023-06-30
Payer: COMMERCIAL

## 2023-06-30 VITALS
TEMPERATURE: 97.5 F | HEIGHT: 70 IN | WEIGHT: 173 LBS | BODY MASS INDEX: 24.77 KG/M2 | HEART RATE: 91 BPM | SYSTOLIC BLOOD PRESSURE: 109 MMHG | DIASTOLIC BLOOD PRESSURE: 73 MMHG | OXYGEN SATURATION: 93 %

## 2023-06-30 PROCEDURE — 99215 OFFICE O/P EST HI 40 MIN: CPT

## 2023-07-05 ENCOUNTER — NON-APPOINTMENT (OUTPATIENT)
Age: 63
End: 2023-07-05

## 2023-07-09 NOTE — HISTORY OF PRESENT ILLNESS
[de-identified] : Referred by: [facility] \par \par Mr. Ashley presented at age 62 in November 2022 for evaluation of newly diagnosed DVT/PE.\par The patient has a medical history of pelvic pain (on amitriptyline). \par \par Presenting HPI: Davy was recently admitted [place] [facility] [place] for LLE DVT. He initially presented to urgent care with LLE pain x 3 days. Imaging showed LLE DVT and he was referred to ED. CTA revealed a PE with concern for R heart strain. LLE doppler revealed DVT within the L common femoral vein extending to the saphenofemoral junction. He was started on a heparin gtt and transitioned to PO xarelto. TTE was negative for R heart strain. He denied history of blood clots or bleeding disorders, recent long distance travel, immobility, recent procedures. His father had a history of DVT while he had cancer. He was discharged on xarelto 15mg BID followed by 20mg daily. Denies bleeding/bruising. Has some mild SOB on exertion. He feels the L leg soreness is improving. \par \par TTE 11/6/22: EF 55%, normal RV size, function, without cor pulmonale\par LLE Doppler US 11/4/22: LEFT common femoral and central greater saphenous vein DVT\par CTA 11/4/22: PE within lobar, several segmental and subsegmental branches within the left upper lobe and lingula, straightening of the cardiac IV septum suspicious for R heart strain, DVT within the L common femoral vein extends to the saphenofemoral junction\par \par Laboratory studies reviewed at today's visit and notable for: WBC 7.25, Hb 16.5, Plt 254\par D-dimer 250\par ESR, CA 19-9, CEA, CRP, iron studies, CMP, B12, folate wnl \par Ferritin 320 \par PSA 3.22 \par \par HCM: \par - COVID vaccination: s/p 3 doses \par - Colonoscopy: last done in 2022, Dr. Love, repeat in 10 years \par - Lung cancer screen: recent CTA chest did not show any lung masses, should continue lung cancer screening with annual low dose chest CT \par - DEXA: never done \par - PSA: pending urine test \par \par SH: \par - Occupation: works for [employer] in sales \par - Living situation: lives in [place] with her mother and his 23 year old son, has a 25 year old daughter as well \par - Smoking/etoh/illicits: quit smoking 1 week ago, was vaping prior to that, smoked for 30 years 1 ppd, he drinks 2-3 beers / day \par - Exercise: fairly physically active \par \par FH: \par - His father had colon cancer around age 63, prostate, and pancreatic cancer \par - His brother has a brain tumor, age 64  [de-identified] : Davy presents for follow up on 6/30/23 for PE/DVT, newly diagnosed prostate cancer, s/p prostatectomy. \par Cardiolipin AB POSITIVE, Cardiolipin IgA wnl, IgG wnl, IgM 116 (H) POSITIVE, Lupus AC POSITIVE, B2 G screen negative \par Factor V leiden, prothrombin gene mutation normal \par \par Since the prior visit, Davy underwent prostatectomy with Dr. Beck ([facility]) on 6/19/23 which revealed prostatic adenocarcinoma, Jose Francisco score 3+4=7 involving approximately 10% of the tissue examined, extra prostatic invasion was present, 5 lymph nodes on the left and right side were negative for malignancy, PNI is identified. pT3a (extraprostatic extension), N0, Mx. He has restarted Xarelto at 20 mg daily to complete 1 month and then will decrease back to 10 mg daily.  At today's visit he continues to have some postsurgical pain.  He denies any bleeding, bruising, blood in his urine/stools. He denies any breathing difficulties. \par \par PSA 4.28 (5/17/23) \par \par Prostate adenocarcinoma- T3a,N0, PSA <10, grade group 2 (jose francisco 3+4=7), overall Stage IIIB\par NCCN High Risk 2/2 T3a (extraprostatic extension) \par

## 2023-07-09 NOTE — PHYSICAL EXAM
[Fully active, able to carry on all pre-disease performance without restriction] : Status 0 - Fully active, able to carry on all pre-disease performance without restriction [Normal] : affect appropriate [de-identified] : lump on LLE shin  [de-identified] : generally well appearing male, NAD, pleasant

## 2023-07-09 NOTE — RESULTS/DATA
[FreeTextEntry1] : Mr. Ashley presented at age 62 in November 2022 for evaluation of newly diagnosed DVT/PE.\par The patient has a medical history of pelvic pain (on amitriptyline). \par \par Unprovoked DVT- Cardiolipin AB POSITIVE, Cardiolipin IgA wnl, IgG wnl, IgM 116 (H) POSITIVE, Lupus AC POSITIVE, B2 G screen negative \par ATIII activity normal, Factor V leiden, prothrombin gene mutation normal \par Intermediate risk APLS. \par Now found to have Prostate adenocarcinoma- T3a,N0, PSA <10, grade group 2 (jose francisco 3+4=7), overall Stage IIIB\par NCCN High Risk 2/2 T3a (extraprostatic extension) \par S/p prostatectomy at [facility] on 6/19/23. \par Pending post-op PSA due August 2023. \par If undetectable, can monitor patient and consider salvage RT if PSA rises. \par If detectable post-op PSA, can consider adjuvant RT and short course of salvage ADT. \par Restarted on full dose AC after surgery. Can likely decrease back to low dose ~1 month after surgery. \par All patient questions answered at today's visit. Patient urged to call the office with any questions or concerns. \par

## 2023-07-10 ENCOUNTER — NON-APPOINTMENT (OUTPATIENT)
Age: 63
End: 2023-07-10

## 2023-07-31 RX ORDER — RIVAROXABAN 20 MG/1
20 TABLET, FILM COATED ORAL
Qty: 60 | Refills: 1 | Status: DISCONTINUED | COMMUNITY
Start: 2023-06-14 | End: 2023-07-31

## 2023-08-01 ENCOUNTER — OUTPATIENT (OUTPATIENT)
Dept: OUTPATIENT SERVICES | Facility: HOSPITAL | Age: 63
LOS: 1 days | End: 2023-08-01
Payer: COMMERCIAL

## 2023-08-01 DIAGNOSIS — I82.409 ACUTE EMBOLISM AND THROMBOSIS OF UNSPECIFIED DEEP VEINS OF UNSPECIFIED LOWER EXTREMITY: ICD-10-CM

## 2023-08-04 ENCOUNTER — RESULT REVIEW (OUTPATIENT)
Age: 63
End: 2023-08-04

## 2023-08-04 ENCOUNTER — APPOINTMENT (OUTPATIENT)
Dept: HEMATOLOGY ONCOLOGY | Facility: CLINIC | Age: 63
End: 2023-08-04
Payer: COMMERCIAL

## 2023-08-04 VITALS
OXYGEN SATURATION: 95 % | SYSTOLIC BLOOD PRESSURE: 102 MMHG | WEIGHT: 173 LBS | BODY MASS INDEX: 24.77 KG/M2 | TEMPERATURE: 98.4 F | DIASTOLIC BLOOD PRESSURE: 67 MMHG | HEART RATE: 83 BPM | RESPIRATION RATE: 16 BRPM | HEIGHT: 70 IN

## 2023-08-04 LAB
BASOPHILS # BLD AUTO: 0.08 K/UL — SIGNIFICANT CHANGE UP (ref 0–0.2)
BASOPHILS NFR BLD AUTO: 1 % — SIGNIFICANT CHANGE UP (ref 0–2)
EOSINOPHIL # BLD AUTO: 0.2 K/UL — SIGNIFICANT CHANGE UP (ref 0–0.5)
EOSINOPHIL NFR BLD AUTO: 2.5 % — SIGNIFICANT CHANGE UP (ref 0–6)
HCT VFR BLD CALC: 42 % — SIGNIFICANT CHANGE UP (ref 39–50)
HGB BLD-MCNC: 13.8 G/DL — SIGNIFICANT CHANGE UP (ref 13–17)
IMM GRANULOCYTES NFR BLD AUTO: 0.4 % — SIGNIFICANT CHANGE UP (ref 0–0.9)
LYMPHOCYTES # BLD AUTO: 2.04 K/UL — SIGNIFICANT CHANGE UP (ref 1–3.3)
LYMPHOCYTES # BLD AUTO: 25.9 % — SIGNIFICANT CHANGE UP (ref 13–44)
MCHC RBC-ENTMCNC: 28.4 PG — SIGNIFICANT CHANGE UP (ref 27–34)
MCHC RBC-ENTMCNC: 32.9 GM/DL — SIGNIFICANT CHANGE UP (ref 32–36)
MCV RBC AUTO: 86.4 FL — SIGNIFICANT CHANGE UP (ref 80–100)
MONOCYTES # BLD AUTO: 0.78 K/UL — SIGNIFICANT CHANGE UP (ref 0–0.9)
MONOCYTES NFR BLD AUTO: 9.9 % — SIGNIFICANT CHANGE UP (ref 2–14)
NEUTROPHILS # BLD AUTO: 4.75 K/UL — SIGNIFICANT CHANGE UP (ref 1.8–7.4)
NEUTROPHILS NFR BLD AUTO: 60.3 % — SIGNIFICANT CHANGE UP (ref 43–77)
NRBC # BLD: 0 /100 WBCS — SIGNIFICANT CHANGE UP (ref 0–0)
PLATELET # BLD AUTO: 321 K/UL — SIGNIFICANT CHANGE UP (ref 150–400)
RBC # BLD: 4.86 M/UL — SIGNIFICANT CHANGE UP (ref 4.2–5.8)
RBC # FLD: 13.2 % — SIGNIFICANT CHANGE UP (ref 10.3–14.5)
WBC # BLD: 7.88 K/UL — SIGNIFICANT CHANGE UP (ref 3.8–10.5)
WBC # FLD AUTO: 7.88 K/UL — SIGNIFICANT CHANGE UP (ref 3.8–10.5)

## 2023-08-04 PROCEDURE — 85027 COMPLETE CBC AUTOMATED: CPT

## 2023-08-04 PROCEDURE — 99214 OFFICE O/P EST MOD 30 MIN: CPT

## 2023-08-04 NOTE — PHYSICAL EXAM
[Fully active, able to carry on all pre-disease performance without restriction] : Status 0 - Fully active, able to carry on all pre-disease performance without restriction [Normal] : affect appropriate [de-identified] : generally well appearing male, NAD, pleasant [de-identified] : lump on LLE shin

## 2023-08-04 NOTE — RESULTS/DATA
[FreeTextEntry1] : Mr. Ashley presented at age 62 in November 2022 for evaluation of newly diagnosed DVT/PE. The patient has a medical history of pelvic pain (on amitriptyline).   Unprovoked DVT- Cardiolipin AB POSITIVE, Cardiolipin IgA wnl, IgG wnl, IgM 116 (H) POSITIVE, Lupus AC POSITIVE, B2 G screen negative  ATIII activity normal, Factor V leiden, prothrombin gene mutation normal  Intermediate risk APLS.  Now found to have Prostate adenocarcinoma- T3a,N0, PSA <10, grade group 2 (jose francisco 3+4=7), overall Stage IIIB NCCN High Risk 2/2 T3a (extraprostatic extension)  S/p prostatectomy at [facility] on 6/19/23.  Pending post-op PSA due August 2023.  If undetectable, can monitor patient and consider salvage RT if PSA rises.  If detectable post-op PSA, can consider adjuvant RT and short course of salvage ADT.  Decreased to xarelto 10mg given hematuria. Hb stable today.  All patient questions answered at today's visit. Patient urged to call the office with any questions or concerns.

## 2023-08-04 NOTE — HISTORY OF PRESENT ILLNESS
[de-identified] : [place]: [facility]  Mr. Ashley presented at age 62 in November 2022 for evaluation of newly diagnosed DVT/PE. The patient has a medical history of pelvic pain (on amitriptyline).  Presenting HPI: Davy was recently [place] [facility] [place] for LLE DVT. He initially presented to urgent care with LLE pain x 3 days. Imaging showed LLE DVT and he was referred to ED. CTA revealed a PE with concern for R heart strain. LLE doppler revealed DVT within the L common femoral vein extending to the saphenofemoral junction. He was started on a heparin gtt and transitioned to PO xarelto. TTE was negative for R heart strain. He denied history of blood clots or bleeding disorders, recent long distance travel, immobility, recent procedures. His father had a history of DVT while he had cancer. He was discharged on xarelto 15mg BID followed by 20mg daily. Denies bleeding/bruising. Has some mild SOB on exertion. He feels the L leg soreness is improving.  TTE 11/6/22: EF 55%, normal RV size, function, without cor pulmonale LLE Doppler US 11/4/22: LEFT common femoral and central greater saphenous vein DVT CTA 11/4/22: PE within lobar, several segmental and subsegmental branches within the left upper lobe and lingula, straightening of the cardiac IV septum suspicious for R heart strain, DVT within the L common femoral vein extends to the saphenofemoral junction  Laboratory studies reviewed at today's visit and notable for: WBC 7.25, Hb 16.5, Plt 254 D-dimer 250 ESR, CA 19-9, CEA, CRP, iron studies, CMP, B12, folate wnl Ferritin 320 PSA 3.22  HCM: - COVID vaccination: s/p 3 doses - Colonoscopy: last done in 2022, Dr. Love, repeat in 10 years - Lung cancer screen: recent CTA chest did not show any lung masses, should continue lung cancer screening with annual low dose chest CT - DEXA: never done  SH: - Occupation: works for [employer] in sales - Living situation: lives in [place] with her mother and his 23 year old son, has a 25 year old daughter as well - Smoking/etoh/illicits: quit smoking 1 week ago, was vaping prior to that, smoked for 30 years 1 ppd, he drinks 2-3 beers / day - Exercise: fairly physically active  FH: - His father had colon cancer around age 63, prostate, and pancreatic cancer - His brother has a brain tumor, age 64. [de-identified] : Davy presents for follow up on 6/30/23 for PE/DVT, newly diagnosed prostate cancer, s/p prostatectomy.  Cardiolipin AB POSITIVE, Cardiolipin IgA wnl, IgG wnl, IgM 116 (H) POSITIVE, Lupus AC POSITIVE, B2 G screen negative  Factor V leiden, prothrombin gene mutation normal  S/p prostatectomy with Dr. Beck ([place]) on 6/19/23 which revealed prostatic adenocarcinoma, Bickmore score 3+4=7 involving approximately 10% of the tissue examined, extra prostatic invasion was present, LN 0/5, PNI is identified. pT3a (extraprostatic extension), N0, Mx.   At today's visit he reports significant hematuria over the last 5.5 weeks. He decreased his xarelto from 20mg to 10mg and believes this helped somewhat. He has a follow up appointment w/ Dr. Beck next Thursday. He has met w/ radiation oncology at [facility]. He denies fevers, chills, CP, SOB, n/v/d, dizziness. He does have some lower abdominal tenderness.   PSA 4.28 (5/17/23)   Prostate adenocarcinoma- T3a,N0, PSA <10, grade group 2 (jose francisco 3+4=7), overall Stage IIIB NCCN High Risk 2/2 T3a (extraprostatic extension)   Laboratory studies reviewed at today's visit and notable for: WBC 7.88, Hb 13.8, plt 321

## 2023-08-07 LAB
ALBUMIN SERPL ELPH-MCNC: 4.2 G/DL
ALP BLD-CCNC: 92 U/L
ALT SERPL-CCNC: 30 U/L
ANION GAP SERPL CALC-SCNC: 12 MMOL/L
AST SERPL-CCNC: 22 U/L
BILIRUB SERPL-MCNC: 0.2 MG/DL
BUN SERPL-MCNC: 16 MG/DL
CALCIUM SERPL-MCNC: 9.2 MG/DL
CHLORIDE SERPL-SCNC: 105 MMOL/L
CO2 SERPL-SCNC: 22 MMOL/L
CREAT SERPL-MCNC: 0.87 MG/DL
EGFR: 98 ML/MIN/1.73M2
GLUCOSE SERPL-MCNC: 99 MG/DL
POTASSIUM SERPL-SCNC: 4.2 MMOL/L
PROT SERPL-MCNC: 6.4 G/DL
PSA FREE FLD-MCNC: NORMAL %
PSA FREE SERPL-MCNC: <0.01 NG/ML
PSA SERPL-MCNC: <0.01 NG/ML
SODIUM SERPL-SCNC: 139 MMOL/L

## 2023-08-09 ENCOUNTER — NON-APPOINTMENT (OUTPATIENT)
Age: 63
End: 2023-08-09

## 2023-09-08 NOTE — END OF VISIT
[Time Spent: ___ minutes] : I have spent [unfilled] minutes of time on the encounter.
PAST MEDICAL HISTORY:  Cystitides, interstitial, chronic     HTN (hypertension)

## 2023-10-03 ENCOUNTER — NON-APPOINTMENT (OUTPATIENT)
Age: 63
End: 2023-10-03

## 2023-10-04 ENCOUNTER — NON-APPOINTMENT (OUTPATIENT)
Age: 63
End: 2023-10-04

## 2023-10-04 ENCOUNTER — APPOINTMENT (OUTPATIENT)
Dept: UROLOGY | Facility: CLINIC | Age: 63
End: 2023-10-04
Payer: COMMERCIAL

## 2023-10-04 ENCOUNTER — LABORATORY RESULT (OUTPATIENT)
Age: 63
End: 2023-10-04

## 2023-10-04 VITALS
HEIGHT: 70 IN | TEMPERATURE: 97.3 F | WEIGHT: 173 LBS | DIASTOLIC BLOOD PRESSURE: 71 MMHG | BODY MASS INDEX: 24.77 KG/M2 | SYSTOLIC BLOOD PRESSURE: 114 MMHG | HEART RATE: 67 BPM

## 2023-10-04 DIAGNOSIS — Z80.42 FAMILY HISTORY OF MALIGNANT NEOPLASM OF PROSTATE: ICD-10-CM

## 2023-10-04 DIAGNOSIS — Z80.0 FAMILY HISTORY OF MALIGNANT NEOPLASM OF DIGESTIVE ORGANS: ICD-10-CM

## 2023-10-04 DIAGNOSIS — Z80.8 FAMILY HISTORY OF MALIGNANT NEOPLASM OF OTHER ORGANS OR SYSTEMS: ICD-10-CM

## 2023-10-04 PROCEDURE — 99204 OFFICE O/P NEW MOD 45 MIN: CPT

## 2023-10-04 RX ORDER — AMITRIPTYLINE HYDROCHLORIDE 10 MG/1
10 TABLET, FILM COATED ORAL
Qty: 60 | Refills: 0 | Status: DISCONTINUED | COMMUNITY
Start: 2022-10-13 | End: 2023-10-04

## 2023-10-04 RX ORDER — TADALAFIL 5 MG/1
TABLET, FILM COATED ORAL
Refills: 0 | Status: ACTIVE | COMMUNITY

## 2023-10-05 LAB
APPEARANCE: CLEAR
BILIRUBIN URINE: NEGATIVE
BLOOD URINE: NEGATIVE
COLOR: YELLOW
GLUCOSE QUALITATIVE U: NEGATIVE MG/DL
KETONES URINE: NEGATIVE MG/DL
LEUKOCYTE ESTERASE URINE: ABNORMAL
NITRITE URINE: NEGATIVE
PH URINE: 6
PROTEIN URINE: NEGATIVE MG/DL
SPECIFIC GRAVITY URINE: 1.02
UROBILINOGEN URINE: 0.2 MG/DL

## 2023-10-08 DIAGNOSIS — N39.0 URINARY TRACT INFECTION, SITE NOT SPECIFIED: ICD-10-CM

## 2023-10-16 ENCOUNTER — APPOINTMENT (OUTPATIENT)
Dept: UROLOGY | Facility: CLINIC | Age: 63
End: 2023-10-16
Payer: COMMERCIAL

## 2023-10-16 VITALS
SYSTOLIC BLOOD PRESSURE: 128 MMHG | WEIGHT: 173 LBS | HEIGHT: 70 IN | HEART RATE: 79 BPM | DIASTOLIC BLOOD PRESSURE: 77 MMHG | TEMPERATURE: 97.9 F | BODY MASS INDEX: 24.77 KG/M2

## 2023-10-16 DIAGNOSIS — N39.46 MIXED INCONTINENCE: ICD-10-CM

## 2023-10-16 LAB
BILIRUB UR QL STRIP: NEGATIVE
CLARITY UR: CLEAR
COLLECTION METHOD: NORMAL
GLUCOSE UR-MCNC: NEGATIVE
HCG UR QL: 0.2 EU/DL
HGB UR QL STRIP.AUTO: NORMAL
KETONES UR-MCNC: NEGATIVE
LEUKOCYTE ESTERASE UR QL STRIP: NEGATIVE
NITRITE UR QL STRIP: NEGATIVE
PH UR STRIP: 5.5
PROT UR STRIP-MCNC: NEGATIVE
SP GR UR STRIP: 1.02

## 2023-10-16 PROCEDURE — 81003 URINALYSIS AUTO W/O SCOPE: CPT | Mod: QW

## 2023-10-16 PROCEDURE — 52000 CYSTOURETHROSCOPY: CPT

## 2023-10-17 DIAGNOSIS — N39.3 STRESS INCONTINENCE (FEMALE) (MALE): ICD-10-CM

## 2023-10-27 ENCOUNTER — OUTPATIENT (OUTPATIENT)
Dept: OUTPATIENT SERVICES | Facility: HOSPITAL | Age: 63
LOS: 1 days | End: 2023-10-27
Payer: COMMERCIAL

## 2023-10-27 DIAGNOSIS — I82.409 ACUTE EMBOLISM AND THROMBOSIS OF UNSPECIFIED DEEP VEINS OF UNSPECIFIED LOWER EXTREMITY: ICD-10-CM

## 2023-10-27 DIAGNOSIS — D64.9 ANEMIA, UNSPECIFIED: ICD-10-CM

## 2023-11-03 ENCOUNTER — RESULT REVIEW (OUTPATIENT)
Age: 63
End: 2023-11-03

## 2023-11-03 ENCOUNTER — APPOINTMENT (OUTPATIENT)
Dept: HEMATOLOGY ONCOLOGY | Facility: CLINIC | Age: 63
End: 2023-11-03
Payer: COMMERCIAL

## 2023-11-03 VITALS
BODY MASS INDEX: 25.91 KG/M2 | HEIGHT: 70 IN | RESPIRATION RATE: 16 BRPM | DIASTOLIC BLOOD PRESSURE: 81 MMHG | WEIGHT: 181 LBS | OXYGEN SATURATION: 94 % | HEART RATE: 88 BPM | SYSTOLIC BLOOD PRESSURE: 127 MMHG | TEMPERATURE: 97.9 F

## 2023-11-03 DIAGNOSIS — N52.1 ERECTILE DYSFUNCTION DUE TO DISEASES CLASSIFIED ELSEWHERE: ICD-10-CM

## 2023-11-03 DIAGNOSIS — N52.9 MALE ERECTILE DYSFUNCTION, UNSPECIFIED: ICD-10-CM

## 2023-11-03 LAB
BASOPHILS # BLD AUTO: 0.07 K/UL — SIGNIFICANT CHANGE UP (ref 0–0.2)
BASOPHILS # BLD AUTO: 0.07 K/UL — SIGNIFICANT CHANGE UP (ref 0–0.2)
BASOPHILS NFR BLD AUTO: 1.1 % — SIGNIFICANT CHANGE UP (ref 0–2)
BASOPHILS NFR BLD AUTO: 1.1 % — SIGNIFICANT CHANGE UP (ref 0–2)
EOSINOPHIL # BLD AUTO: 0.3 K/UL — SIGNIFICANT CHANGE UP (ref 0–0.5)
EOSINOPHIL # BLD AUTO: 0.3 K/UL — SIGNIFICANT CHANGE UP (ref 0–0.5)
EOSINOPHIL NFR BLD AUTO: 4.8 % — SIGNIFICANT CHANGE UP (ref 0–6)
EOSINOPHIL NFR BLD AUTO: 4.8 % — SIGNIFICANT CHANGE UP (ref 0–6)
HCT VFR BLD CALC: 46.7 % — SIGNIFICANT CHANGE UP (ref 39–50)
HCT VFR BLD CALC: 46.7 % — SIGNIFICANT CHANGE UP (ref 39–50)
HGB BLD-MCNC: 15.4 G/DL — SIGNIFICANT CHANGE UP (ref 13–17)
HGB BLD-MCNC: 15.4 G/DL — SIGNIFICANT CHANGE UP (ref 13–17)
IMM GRANULOCYTES NFR BLD AUTO: 0.3 % — SIGNIFICANT CHANGE UP (ref 0–0.9)
IMM GRANULOCYTES NFR BLD AUTO: 0.3 % — SIGNIFICANT CHANGE UP (ref 0–0.9)
LYMPHOCYTES # BLD AUTO: 1.83 K/UL — SIGNIFICANT CHANGE UP (ref 1–3.3)
LYMPHOCYTES # BLD AUTO: 1.83 K/UL — SIGNIFICANT CHANGE UP (ref 1–3.3)
LYMPHOCYTES # BLD AUTO: 29.2 % — SIGNIFICANT CHANGE UP (ref 13–44)
LYMPHOCYTES # BLD AUTO: 29.2 % — SIGNIFICANT CHANGE UP (ref 13–44)
MCHC RBC-ENTMCNC: 27.9 PG — SIGNIFICANT CHANGE UP (ref 27–34)
MCHC RBC-ENTMCNC: 27.9 PG — SIGNIFICANT CHANGE UP (ref 27–34)
MCHC RBC-ENTMCNC: 33 GM/DL — SIGNIFICANT CHANGE UP (ref 32–36)
MCHC RBC-ENTMCNC: 33 GM/DL — SIGNIFICANT CHANGE UP (ref 32–36)
MCV RBC AUTO: 84.8 FL — SIGNIFICANT CHANGE UP (ref 80–100)
MCV RBC AUTO: 84.8 FL — SIGNIFICANT CHANGE UP (ref 80–100)
MONOCYTES # BLD AUTO: 0.6 K/UL — SIGNIFICANT CHANGE UP (ref 0–0.9)
MONOCYTES # BLD AUTO: 0.6 K/UL — SIGNIFICANT CHANGE UP (ref 0–0.9)
MONOCYTES NFR BLD AUTO: 9.6 % — SIGNIFICANT CHANGE UP (ref 2–14)
MONOCYTES NFR BLD AUTO: 9.6 % — SIGNIFICANT CHANGE UP (ref 2–14)
NEUTROPHILS # BLD AUTO: 3.44 K/UL — SIGNIFICANT CHANGE UP (ref 1.8–7.4)
NEUTROPHILS # BLD AUTO: 3.44 K/UL — SIGNIFICANT CHANGE UP (ref 1.8–7.4)
NEUTROPHILS NFR BLD AUTO: 55 % — SIGNIFICANT CHANGE UP (ref 43–77)
NEUTROPHILS NFR BLD AUTO: 55 % — SIGNIFICANT CHANGE UP (ref 43–77)
NRBC # BLD: 0 /100 WBCS — SIGNIFICANT CHANGE UP (ref 0–0)
NRBC # BLD: 0 /100 WBCS — SIGNIFICANT CHANGE UP (ref 0–0)
PLATELET # BLD AUTO: 266 K/UL — SIGNIFICANT CHANGE UP (ref 150–400)
PLATELET # BLD AUTO: 266 K/UL — SIGNIFICANT CHANGE UP (ref 150–400)
RBC # BLD: 5.51 M/UL — SIGNIFICANT CHANGE UP (ref 4.2–5.8)
RBC # BLD: 5.51 M/UL — SIGNIFICANT CHANGE UP (ref 4.2–5.8)
RBC # FLD: 13.7 % — SIGNIFICANT CHANGE UP (ref 10.3–14.5)
RBC # FLD: 13.7 % — SIGNIFICANT CHANGE UP (ref 10.3–14.5)
WBC # BLD: 6.26 K/UL — SIGNIFICANT CHANGE UP (ref 3.8–10.5)
WBC # BLD: 6.26 K/UL — SIGNIFICANT CHANGE UP (ref 3.8–10.5)
WBC # FLD AUTO: 6.26 K/UL — SIGNIFICANT CHANGE UP (ref 3.8–10.5)
WBC # FLD AUTO: 6.26 K/UL — SIGNIFICANT CHANGE UP (ref 3.8–10.5)

## 2023-11-03 PROCEDURE — 99214 OFFICE O/P EST MOD 30 MIN: CPT

## 2023-11-03 PROCEDURE — 85027 COMPLETE CBC AUTOMATED: CPT

## 2023-11-03 RX ORDER — CEPHALEXIN 500 MG/1
500 TABLET ORAL
Qty: 20 | Refills: 0 | Status: DISCONTINUED | COMMUNITY
Start: 2023-10-08 | End: 2023-11-03

## 2023-11-03 RX ORDER — TADALAFIL 5 MG/1
5 TABLET ORAL
Qty: 90 | Refills: 2 | Status: ACTIVE | COMMUNITY
Start: 2023-11-03 | End: 1900-01-01

## 2023-11-03 RX ORDER — TADALAFIL 20 MG/1
20 TABLET, FILM COATED ORAL
Qty: 30 | Refills: 1 | Status: ACTIVE | COMMUNITY
Start: 2023-11-03 | End: 1900-01-01

## 2023-11-05 PROBLEM — N52.9 ERECTILE DYSFUNCTION: Status: ACTIVE | Noted: 2023-11-03

## 2023-11-05 PROBLEM — N52.1 ERECTILE DYSFUNCTION DUE TO DISEASES CLASSIFIED ELSEWHERE: Status: ACTIVE | Noted: 2023-11-03

## 2023-11-06 LAB
ALBUMIN SERPL ELPH-MCNC: 4.4 G/DL
ALP BLD-CCNC: 77 U/L
ALT SERPL-CCNC: 23 U/L
ANION GAP SERPL CALC-SCNC: 15 MMOL/L
AST SERPL-CCNC: 17 U/L
BILIRUB SERPL-MCNC: 0.2 MG/DL
BUN SERPL-MCNC: 15 MG/DL
CALCIUM SERPL-MCNC: 9.4 MG/DL
CHLORIDE SERPL-SCNC: 106 MMOL/L
CO2 SERPL-SCNC: 25 MMOL/L
CREAT SERPL-MCNC: 0.98 MG/DL
EGFR: 87 ML/MIN/1.73M2
GLUCOSE SERPL-MCNC: 108 MG/DL
POTASSIUM SERPL-SCNC: 4.2 MMOL/L
PROT SERPL-MCNC: 6.6 G/DL
PSA FREE FLD-MCNC: NORMAL %
PSA FREE SERPL-MCNC: <0.01 NG/ML
PSA SERPL-MCNC: <0.01 NG/ML
SODIUM SERPL-SCNC: 145 MMOL/L

## 2023-11-13 ENCOUNTER — APPOINTMENT (OUTPATIENT)
Dept: UROLOGY | Facility: CLINIC | Age: 63
End: 2023-11-13
Payer: COMMERCIAL

## 2023-11-13 VITALS
DIASTOLIC BLOOD PRESSURE: 68 MMHG | OXYGEN SATURATION: 97 % | HEIGHT: 70 IN | WEIGHT: 181 LBS | TEMPERATURE: 97.1 F | HEART RATE: 89 BPM | SYSTOLIC BLOOD PRESSURE: 101 MMHG | BODY MASS INDEX: 25.91 KG/M2 | RESPIRATION RATE: 16 BRPM

## 2023-11-13 PROCEDURE — 99213 OFFICE O/P EST LOW 20 MIN: CPT

## 2024-02-06 ENCOUNTER — OUTPATIENT (OUTPATIENT)
Dept: OUTPATIENT SERVICES | Facility: HOSPITAL | Age: 64
LOS: 1 days | End: 2024-02-06

## 2024-02-06 DIAGNOSIS — I82.409 ACUTE EMBOLISM AND THROMBOSIS OF UNSPECIFIED DEEP VEINS OF UNSPECIFIED LOWER EXTREMITY: ICD-10-CM

## 2024-02-12 ENCOUNTER — APPOINTMENT (OUTPATIENT)
Dept: HEMATOLOGY ONCOLOGY | Facility: CLINIC | Age: 64
End: 2024-02-12
Payer: COMMERCIAL

## 2024-02-12 ENCOUNTER — LABORATORY RESULT (OUTPATIENT)
Age: 64
End: 2024-02-12

## 2024-02-12 VITALS
SYSTOLIC BLOOD PRESSURE: 123 MMHG | HEIGHT: 70 IN | DIASTOLIC BLOOD PRESSURE: 82 MMHG | HEART RATE: 70 BPM | OXYGEN SATURATION: 96 % | WEIGHT: 178.6 LBS | TEMPERATURE: 97.6 F | BODY MASS INDEX: 25.57 KG/M2

## 2024-02-12 LAB
HCT VFR BLD CALC: 47.3 %
HGB BLD-MCNC: 16 G/DL
MCHC RBC-ENTMCNC: 29 PG
MCHC RBC-ENTMCNC: 33.8 GM/DL
MCV RBC AUTO: 85.8 FL
PLATELET # BLD AUTO: 222 K/UL
RBC # BLD: 5.51 M/UL
RBC # FLD: 13.7 %
WBC # FLD AUTO: 5.62 K/UL

## 2024-02-12 PROCEDURE — 85027 COMPLETE CBC AUTOMATED: CPT

## 2024-02-12 PROCEDURE — 99214 OFFICE O/P EST MOD 30 MIN: CPT

## 2024-02-12 NOTE — RESULTS/DATA
[FreeTextEntry1] : Mr. Ashley initially presented at age 62 in November 2022 for evaluation of newly diagnosed DVT/PE. The patient has a medical history of pelvic pain (on amitriptyline).   Unprovoked DVT- Cardiolipin AB POSITIVE, Cardiolipin IgA wnl, IgG wnl, IgM 116 (H) POSITIVE, Lupus AC POSITIVE, B2 G screen negative  ATIII activity normal, Factor V leiden, prothrombin gene mutation normal  Intermediate risk APLS Subsequenlty found to have Prostate adenocarcinoma- T3a,N0, PSA <10, grade group 2 (jose francisco 3+4=7), overall Stage IIIB NCCN High Risk 2/2 T3a (extraprostatic extension)  S/p prostatectomy at [facility] on 6/19/23.  Post-op PSA was undetectable- will continue to monitor PSA a 3 months and consider salvage RT if PSA rises.  He passed a ?stitch in his urine which was very painful.  S/p cystoscopy w/ Dr. Smith - reports he cauterized a bleeding area, which has resolved his hematuria. He remains on xarelto 10mg daily at this time. He will continue to follow up w/ Dr. Smith (next visit 5/2024) RTC 3 months w. Dr. Tellez in 3 months, sooner prn. All patient questions answered at today's visit. Patient urged to call the office with any questions or concerns.

## 2024-02-12 NOTE — PHYSICAL EXAM
[Fully active, able to carry on all pre-disease performance without restriction] : Status 0 - Fully active, able to carry on all pre-disease performance without restriction [Normal] : affect appropriate [de-identified] : generally well appearing male, NAD, pleasant [de-identified] : lump on LLE shin

## 2024-02-12 NOTE — HISTORY OF PRESENT ILLNESS
[de-identified] : [place] [facility]  Mr. Ashley initially presented at age 62 in November 2022 for evaluation of newly diagnosed DVT/PE. The patient has a medical history of pelvic pain (on amitriptyline).  Presenting HPI: Davy had been [place] [facility] [place] LLE DVT. He had presented to urgent care with LLE pain x 3 days. Imaging showed LLE DVT and he was referred to ED. CTA revealed a PE with concern for R heart strain. LLE doppler revealed DVT within the L common femoral vein extending to the saphenofemoral junction. He was started on a heparin gtt and transitioned to PO xarelto. TTE was negative for R heart strain. He denied history of blood clots or bleeding disorders, recent long distance travel, immobility, recent procedures. His father had a history of DVT while he had cancer. He was discharged on xarelto 15mg BID followed by 20mg daily. Denied bleeding/bruising. He had some mild SOB on exertion.   TTE 11/6/22: EF 55%, normal RV size, function, without cor pulmonale LLE Doppler US 11/4/22: LEFT common femoral and central greater saphenous vein DVT CTA 11/4/22: PE within lobar, several segmental and subsegmental branches within the left upper lobe and lingula, straightening of the cardiac IV septum suspicious for R heart strain, DVT within the L common femoral vein extends to the saphenofemoral junction  Laboratory studies reviewed at today's visit and notable for: WBC 7.25, Hb 16.5, Plt 254 D-dimer 250 ESR, CA 19-9, CEA, CRP, iron studies, CMP, B12, folate wnl Ferritin 320 PSA 3.22  HCM: - COVID vaccination: s/p 3 doses - Colonoscopy: last done in 2022, Dr. Love, repeat in 10 years - Lung cancer screen: recent CTA chest did not show any lung masses, should continue lung cancer screening with annual low dose chest CT - DEXA: never done  SH: - Occupation: works for [employer] in sales - Living situation: lives in [place] with her mother and his 23 year old son, has a 25 year old daughter as well - Smoking/etoh/illicits: quit smoking 1 week ago, was vaping prior to that, smoked for 30 years 1 ppd, he drinks 2-3 beers / day - Exercise: fairly physically active  FH: - His father had colon cancer around age 63, prostate, and pancreatic cancer - His brother has a brain tumor, age 64. [de-identified] : Davy presents for follow up on 2/12/24 for PE/DVT, recently diagnosed prostate cancer, s/p prostatectomy.  Cardiolipin AB POSITIVE, Cardiolipin IgA wnl, IgG wnl, IgM 116 (H) POSITIVE, Lupus AC POSITIVE, B2 G screen negative  Factor V leiden, prothrombin gene mutation normal  S/p prostatectomy with Dr. Beck ([place]) on 6/19/23 which revealed prostatic adenocarcinoma, Jose Francisco score 3+4=7 involving approximately 10% of the tissue examined, extra prostatic invasion was present, LN 0/5, PNI is identified. pT3a (extraprostatic extension), N0, Mx.   At today's visit Davy is feeling generally well.  No further hematuria.  Nocturia has resumed (awakens ~ 3 times/night).  Denies symptoms of acute UTI. He is s/p cystoscopy w/ Dr. Smith - required cautery of a bleeding area.  He remains on xarelto 10mg daily.  Energy level is good.  No bleeding issues.  He denies fevers, chills, CP, SOB, n/v/d. He does report erectile dysfunction.  Happy to be recently retired.   Prostate adenocarcinoma- T3a,N0, PSA <10, grade group 2 (jose francisco 3+4=7), overall Stage IIIB NCCN High Risk 2/2 T3a (extraprostatic extension)   Laboratory studies reviewed at today's visit and notable for: WBC 5.62, Hb 16.0, plt 222 PSA 4.28 (5/17/23) --> <0.01 (11/3/23)

## 2024-02-13 LAB
ALBUMIN SERPL ELPH-MCNC: 4.3 G/DL
ALP BLD-CCNC: 79 U/L
ALT SERPL-CCNC: 22 U/L
ANION GAP SERPL CALC-SCNC: 13 MMOL/L
AST SERPL-CCNC: 21 U/L
BILIRUB SERPL-MCNC: 0.4 MG/DL
BUN SERPL-MCNC: 13 MG/DL
CALCIUM SERPL-MCNC: 9.4 MG/DL
CHLORIDE SERPL-SCNC: 103 MMOL/L
CO2 SERPL-SCNC: 24 MMOL/L
CREAT SERPL-MCNC: 0.91 MG/DL
EGFR: 95 ML/MIN/1.73M2
GLUCOSE SERPL-MCNC: 104 MG/DL
POTASSIUM SERPL-SCNC: 4.9 MMOL/L
PROT SERPL-MCNC: 6.6 G/DL
PSA FREE FLD-MCNC: NORMAL %
PSA FREE SERPL-MCNC: <0.01 NG/ML
PSA SERPL-MCNC: <0.01 NG/ML
SODIUM SERPL-SCNC: 140 MMOL/L

## 2024-02-15 LAB
TESTOST FREE SERPL-MCNC: 5.3 PG/ML
TESTOST SERPL-MCNC: 452 NG/DL

## 2024-02-23 NOTE — PHYSICAL EXAM
[Normal Appearance] : normal appearance [Well Groomed] : well groomed [Abdomen Soft] : soft [Urinary Bladder Findings] : the bladder was normal on palpation [Edema] : no peripheral edema [] : no respiratory distress [Normal Station and Gait] : the gait and station were normal for the patient's age [Oriented To Time, Place, And Person] : oriented to person, place, and time [No Focal Deficits] : no focal deficits

## 2024-02-26 ENCOUNTER — APPOINTMENT (OUTPATIENT)
Dept: UROLOGY | Facility: CLINIC | Age: 64
End: 2024-02-26
Payer: COMMERCIAL

## 2024-02-26 VITALS
SYSTOLIC BLOOD PRESSURE: 95 MMHG | HEIGHT: 69 IN | BODY MASS INDEX: 26.36 KG/M2 | TEMPERATURE: 97 F | HEART RATE: 71 BPM | WEIGHT: 178 LBS | DIASTOLIC BLOOD PRESSURE: 61 MMHG

## 2024-02-26 PROCEDURE — 81002 URINALYSIS NONAUTO W/O SCOPE: CPT

## 2024-02-26 PROCEDURE — 99214 OFFICE O/P EST MOD 30 MIN: CPT

## 2024-02-26 NOTE — ASSESSMENT
[FreeTextEntry1] : Nov 2023: finished course oral antibiotic for UTI. gross hematuria resolved after cysto and cauterization of bleeder. PSA <0.01 feb 2024: PSA<0.01 / POC urine: moderate blood/ 2 new episodes of gross blood in urine. no other sex/ CT uro 6 months ago, tiny lower pole stone left kidney.  plan: Uculture urine cytology cystoscopy, possible fulguration of bleeder and r/o stricture/bladder neck contracture

## 2024-02-26 NOTE — HISTORY OF PRESENT ILLNESS
[FreeTextEntry1] : Mr. Ashley presented at age 63 in November 2022 DVT/PE. Past med hx: Imaging showed LLE DVT and he was referred to ED. CTA revealed a PE with concern for R heart strain. LLE doppler revealed DVT within the L common femoral vein extending to the saphenofemoral junction. He was started on a heparin gtt and transitioned to PO xarelto. TTE was negative for R heart strain.  currently is on xarelto 10mg QD - Smoking/etoh/illicits: quit smoking 1 week ago, was vaping prior to that, smoked for 30 years 1 ppd, he drinks 2-3 beers / day Cardiolipin AB POSITIVE, Cardiolipin IgA wnl, IgG wnl, IgM 116 (H) POSITIVE, Lupus AC POSITIVE, B2 G screen negative  Factor V leiden, prothrombin gene mutation normal   newly diagnosed prostate cancer, s/p prostatectomy (june 2023). Pre-op PSA 4.28 (5/17/23)  S/p prostatectomy with Dr. Beck ([place]) on 6/19/23 which revealed prostatic adenocarcinoma, Edith score 3+4=7 involving approximately 10% of the tissue examined, extra prostatic invasion was present, LN 0/10, PNI is identified. pT3a (extraprostatic extension), N0, Mx. Post RALP PSA <0.01 At today's visit he reports significant hematuria over the last 5.5 weeks. He decreased his xarelto from 20mg to 10mg and believes this helped somewhat. does have incontinence, changes 2 pads per day. sometimes urine is bright, other times it is dark blood. to note, patient mentions that upon catheter removal after his operaton, catheter did not come out easy. PA at [facility] had to full very hard after "deflating the balloon" which the patient did not see any water come out of that balloon, she had to pull catheter aggressively out of the urethra. R/O traumatic urethral injury / open DVC  Nov 2023: finished course oral antibiotic for UTI. gross hematuria resolved after cysto and cauterization of bleeder. PSA <0.01 feb 2024: PSA<0.01 / POC urine: moderate blood/ 2 new episodes of gross blood in urine. no other sex/ CT uro 6 months ago, tiny lower pole stone left kidney.

## 2024-02-28 LAB
BACTERIA UR CULT: NORMAL
URINE CYTOLOGY: NORMAL

## 2024-03-11 ENCOUNTER — APPOINTMENT (OUTPATIENT)
Dept: UROLOGY | Facility: CLINIC | Age: 64
End: 2024-03-11

## 2024-03-11 ENCOUNTER — APPOINTMENT (OUTPATIENT)
Dept: UROLOGY | Facility: CLINIC | Age: 64
End: 2024-03-11
Payer: COMMERCIAL

## 2024-03-11 VITALS
HEIGHT: 69 IN | TEMPERATURE: 96.5 F | OXYGEN SATURATION: 99 % | SYSTOLIC BLOOD PRESSURE: 111 MMHG | WEIGHT: 178 LBS | HEART RATE: 66 BPM | DIASTOLIC BLOOD PRESSURE: 68 MMHG | BODY MASS INDEX: 26.36 KG/M2

## 2024-03-11 PROCEDURE — 52000 CYSTOURETHROSCOPY: CPT

## 2024-03-11 PROCEDURE — 81002 URINALYSIS NONAUTO W/O SCOPE: CPT

## 2024-04-22 ENCOUNTER — OUTPATIENT (OUTPATIENT)
Dept: OUTPATIENT SERVICES | Facility: HOSPITAL | Age: 64
LOS: 1 days | End: 2024-04-22

## 2024-04-22 DIAGNOSIS — I82.409 ACUTE EMBOLISM AND THROMBOSIS OF UNSPECIFIED DEEP VEINS OF UNSPECIFIED LOWER EXTREMITY: ICD-10-CM

## 2024-04-29 ENCOUNTER — RESULT REVIEW (OUTPATIENT)
Age: 64
End: 2024-04-29

## 2024-04-29 ENCOUNTER — APPOINTMENT (OUTPATIENT)
Dept: HEMATOLOGY ONCOLOGY | Facility: CLINIC | Age: 64
End: 2024-04-29

## 2024-04-29 LAB
BASOPHILS # BLD AUTO: 0.05 K/UL — SIGNIFICANT CHANGE UP (ref 0–0.2)
BASOPHILS NFR BLD AUTO: 0.8 % — SIGNIFICANT CHANGE UP (ref 0–2)
EOSINOPHIL # BLD AUTO: 0.17 K/UL — SIGNIFICANT CHANGE UP (ref 0–0.5)
EOSINOPHIL NFR BLD AUTO: 2.8 % — SIGNIFICANT CHANGE UP (ref 0–6)
HCT VFR BLD CALC: 47.7 % — SIGNIFICANT CHANGE UP (ref 39–50)
HGB BLD-MCNC: 16.5 G/DL — SIGNIFICANT CHANGE UP (ref 13–17)
IMM GRANULOCYTES NFR BLD AUTO: 0.2 % — SIGNIFICANT CHANGE UP (ref 0–0.9)
LYMPHOCYTES # BLD AUTO: 1.74 K/UL — SIGNIFICANT CHANGE UP (ref 1–3.3)
LYMPHOCYTES # BLD AUTO: 28.6 % — SIGNIFICANT CHANGE UP (ref 13–44)
MCHC RBC-ENTMCNC: 29.7 PG — SIGNIFICANT CHANGE UP (ref 27–34)
MCHC RBC-ENTMCNC: 34.6 GM/DL — SIGNIFICANT CHANGE UP (ref 32–36)
MCV RBC AUTO: 85.8 FL — SIGNIFICANT CHANGE UP (ref 80–100)
MONOCYTES # BLD AUTO: 0.48 K/UL — SIGNIFICANT CHANGE UP (ref 0–0.9)
MONOCYTES NFR BLD AUTO: 7.9 % — SIGNIFICANT CHANGE UP (ref 2–14)
NEUTROPHILS # BLD AUTO: 3.63 K/UL — SIGNIFICANT CHANGE UP (ref 1.8–7.4)
NEUTROPHILS NFR BLD AUTO: 59.7 % — SIGNIFICANT CHANGE UP (ref 43–77)
NRBC # BLD: 0 /100 WBCS — SIGNIFICANT CHANGE UP (ref 0–0)
PLATELET # BLD AUTO: 245 K/UL — SIGNIFICANT CHANGE UP (ref 150–400)
RBC # BLD: 5.56 M/UL — SIGNIFICANT CHANGE UP (ref 4.2–5.8)
RBC # FLD: 13.3 % — SIGNIFICANT CHANGE UP (ref 10.3–14.5)
WBC # BLD: 6.08 K/UL — SIGNIFICANT CHANGE UP (ref 3.8–10.5)
WBC # FLD AUTO: 6.08 K/UL — SIGNIFICANT CHANGE UP (ref 3.8–10.5)

## 2024-04-29 PROCEDURE — 85027 COMPLETE CBC AUTOMATED: CPT

## 2024-04-30 LAB
ALBUMIN SERPL ELPH-MCNC: 4.3 G/DL
ALP BLD-CCNC: 82 U/L
ALT SERPL-CCNC: 24 U/L
ANION GAP SERPL CALC-SCNC: 14 MMOL/L
AST SERPL-CCNC: 19 U/L
BILIRUB SERPL-MCNC: 0.4 MG/DL
BUN SERPL-MCNC: 13 MG/DL
CALCIUM SERPL-MCNC: 9.7 MG/DL
CHLORIDE SERPL-SCNC: 104 MMOL/L
CO2 SERPL-SCNC: 20 MMOL/L
CREAT SERPL-MCNC: 0.88 MG/DL
EGFR: 97 ML/MIN/1.73M2
GLUCOSE SERPL-MCNC: 108 MG/DL
POTASSIUM SERPL-SCNC: 4.5 MMOL/L
PROT SERPL-MCNC: 6.6 G/DL
PSA FREE FLD-MCNC: NORMAL %
PSA FREE SERPL-MCNC: <0.01 NG/ML
PSA SERPL-MCNC: <0.01 NG/ML
SODIUM SERPL-SCNC: 139 MMOL/L
TESTOST FREE SERPL-MCNC: 5 PG/ML
TESTOST SERPL-MCNC: 428 NG/DL

## 2024-05-01 ENCOUNTER — APPOINTMENT (OUTPATIENT)
Dept: UROLOGY | Facility: CLINIC | Age: 64
End: 2024-05-01
Payer: COMMERCIAL

## 2024-05-01 VITALS
WEIGHT: 178 LBS | DIASTOLIC BLOOD PRESSURE: 61 MMHG | BODY MASS INDEX: 26.36 KG/M2 | HEART RATE: 78 BPM | HEIGHT: 69 IN | SYSTOLIC BLOOD PRESSURE: 120 MMHG | TEMPERATURE: 97.3 F

## 2024-05-01 DIAGNOSIS — R31.0 GROSS HEMATURIA: ICD-10-CM

## 2024-05-01 PROCEDURE — 81003 URINALYSIS AUTO W/O SCOPE: CPT | Mod: QW

## 2024-05-01 PROCEDURE — 99213 OFFICE O/P EST LOW 20 MIN: CPT | Mod: 25

## 2024-05-01 NOTE — ASSESSMENT
[FreeTextEntry1] : Nov 2023: finished course oral antibiotic for UTI. gross hematuria resolved after cysto and cauterization of bleeder. PSA <0.01 feb 2024: PSA<0.01 / POC urine: moderate blood/ 2 new episodes of gross blood in urine. no other sex/ CT uro 6 months ago, tiny lower pole stone left kidney. May 2024: PSA<0.01 - new gross hematuria? yes very rarely sees some blood in urine / poc urine: small blood  plan: cysto in 6 months PSA 6 months

## 2024-05-01 NOTE — PHYSICAL EXAM
[Normal Appearance] : normal appearance [Well Groomed] : well groomed [Abdomen Soft] : soft [Urinary Bladder Findings] : the bladder was normal on palpation [] : no respiratory distress [Edema] : no peripheral edema [Oriented To Time, Place, And Person] : oriented to person, place, and time [Normal Station and Gait] : the gait and station were normal for the patient's age [No Focal Deficits] : no focal deficits

## 2024-05-01 NOTE — HISTORY OF PRESENT ILLNESS
[FreeTextEntry1] : Mr. Ashley presented at age 63 in November 2022 DVT/PE. Past med hx: Imaging showed LLE DVT and he was referred to ED. CTA revealed a PE with concern for R heart strain. LLE doppler revealed DVT within the L common femoral vein extending to the saphenofemoral junction. He was started on a heparin gtt and transitioned to PO xarelto. TTE was negative for R heart strain.  currently is on xarelto 10mg QD - Smoking/etoh/illicits: quit smoking 1 week ago, was vaping prior to that, smoked for 30 years 1 ppd, he drinks 2-3 beers / day Cardiolipin AB POSITIVE, Cardiolipin IgA wnl, IgG wnl, IgM 116 (H) POSITIVE, Lupus AC POSITIVE, B2 G screen negative  Factor V leiden, prothrombin gene mutation normal   newly diagnosed prostate cancer, s/p prostatectomy (june 2023). Pre-op PSA 4.28 (5/17/23)  S/p prostatectomy with Dr. Beck ([place]) on 6/19/23 which revealed prostatic adenocarcinoma, Edith score 3+4=7 involving approximately 10% of the tissue examined, extra prostatic invasion was present, LN 0/10, PNI is identified. pT3a (extraprostatic extension), N0, Mx. Post RALP PSA <0.01  to note, patient mentions that upon catheter removal after his operation, catheter did not come out easy. PA at [facility] had to full very hard after "deflating the balloon" which the patient did not see any water come out of that balloon, she had to pull catheter aggressively out of the urethra. R/O traumatic urethral injury / open DVC  Nov 2023: finished course oral antibiotic for UTI. gross hematuria resolved after cysto and cauterization of bleeder. PSA <0.01 feb 2024: PSA<0.01 / POC urine: moderate blood/ 2 new episodes of gross blood in urine. no other sex/ CT uro 6 months ago, tiny lower pole stone left kidney. May 2024: PSA<0.01 - new gross hematuria? yes very rarely sees some blood in urine / poc urine: small blood

## 2024-05-13 ENCOUNTER — APPOINTMENT (OUTPATIENT)
Dept: HEMATOLOGY ONCOLOGY | Facility: CLINIC | Age: 64
End: 2024-05-13
Payer: COMMERCIAL

## 2024-05-13 VITALS
OXYGEN SATURATION: 96 % | TEMPERATURE: 97.9 F | HEIGHT: 69 IN | BODY MASS INDEX: 26.07 KG/M2 | DIASTOLIC BLOOD PRESSURE: 73 MMHG | SYSTOLIC BLOOD PRESSURE: 110 MMHG | HEART RATE: 99 BPM | WEIGHT: 176 LBS

## 2024-05-13 DIAGNOSIS — C61 MALIGNANT NEOPLASM OF PROSTATE: ICD-10-CM

## 2024-05-13 DIAGNOSIS — I82.412 ACUTE EMBOLISM AND THROMBOSIS OF LEFT FEMORAL VEIN: ICD-10-CM

## 2024-05-13 PROCEDURE — G2211 COMPLEX E/M VISIT ADD ON: CPT | Mod: NC,1L

## 2024-05-13 PROCEDURE — 99213 OFFICE O/P EST LOW 20 MIN: CPT

## 2024-05-13 NOTE — HISTORY OF PRESENT ILLNESS
[de-identified] : Referred by: [facility]  Mr. Ashley presented at age 62 in November 2022 for evaluation of newly diagnosed DVT/PE. The patient has a medical history of pelvic pain (on amitriptyline).  Presenting HPI: Davy was recently admitted to [facility] from 11/4-11/7 for LLE DVT. He initially presented to urgent care with LLE pain x 3 days. Imaging showed LLE DVT and he was referred to ED. CTA revealed a PE with concern for R heart strain. LLE doppler revealed DVT within the L common femoral vein extending to the saphenofemoral junction. He was started on a heparin gtt and transitioned to PO xarelto. TTE was negative for R heart strain. He denied history of blood clots or bleeding disorders, recent long distance travel, immobility, recent procedures. His father had a history of DVT while he had cancer. He was discharged on xarelto 15mg BID followed by 20mg daily. Denies bleeding/bruising. Has some mild SOB on exertion. He feels the L leg soreness is improving.  TTE 11/6/22: EF 55%, normal RV size, function, without cor pulmonale LLE Doppler US 11/4/22: LEFT common femoral and central greater saphenous vein DVT CTA 11/4/22: PE within lobar, several segmental and subsegmental branches within the left upper lobe and lingula, straightening of the cardiac IV septum suspicious for R heart strain, DVT within the L common femoral vein extends to the saphenofemoral junction  Laboratory studies reviewed at today's visit and notable for: WBC 7.25, Hb 16.5, Plt 254 D-dimer 250 ESR, CA 19-9, CEA, CRP, iron studies, CMP, B12, folate wnl Ferritin 320 PSA 3.22  HCM: - COVID vaccination: s/p 3 doses - Colonoscopy: last done in 2022, Dr. Love, repeat in 10 years - Lung cancer screen: recent CTA chest did not show any lung masses, should continue lung cancer screening with annual low dose chest CT - DEXA: never done  SH: - Occupation: works for [employer] in sales - Living situation: lives in [place] with her mother and his 23 year old son, has a 25 year old daughter as well - Smoking/etoh/illicits: quit smoking 1 week ago, was vaping prior to that, smoked for 30 years 1 ppd, he drinks 2-3 beers / day - Exercise: fairly physically active  FH: - His father had colon cancer around age 63, prostate, and pancreatic cancer - His brother has a brain tumor, age 64. [de-identified] : Davy presents for follow up on 5/13/24 for PE/DVT, newly diagnosed prostate cancer, s/p prostatectomy.  Prostate adenocarcinoma- T3a,N0, PSA <10, grade group 2 (jose francisco 3+4=7), overall Stage IIIB NCCN High Risk 2/2 T3a (extraprostatic extension)   At today's visit Davy is feeling improved. Remains on surveillance. He reports continued hematuria (but decreased)-  hemoglobin stable. Saw Dr. Smith on 5/1/24- plan for cystoscopy in 6 months (November 2024). Remains on xarelto 10mg for hx DVT.  CT urogram 10/8/23 - s/p prostatectomy, non-obstructing bilateral renal calculi, bilateral renal cysts He otherwise feels well- denies fevers, chills, CP, SOB, n/v/d. He does report erectile dysfunction.   PSA 4/30/24- <0.01

## 2024-05-13 NOTE — PHYSICAL EXAM
[Fully active, able to carry on all pre-disease performance without restriction] : Status 0 - Fully active, able to carry on all pre-disease performance without restriction [Normal] : affect appropriate [de-identified] : generally well appearing male, NAD, pleasant [de-identified] : lump on LLE shin

## 2024-05-13 NOTE — RESULTS/DATA
[FreeTextEntry1] : Mr. Ashley presented at age 62 in November 2022 for evaluation of newly diagnosed DVT/PE. The patient has a medical history of pelvic pain (on amitriptyline).   Unprovoked DVT- Cardiolipin AB POSITIVE, Cardiolipin IgA wnl, IgG wnl, IgM 116 (H) POSITIVE, Lupus AC POSITIVE, B2 G screen negative  ATIII activity normal, Factor V leiden, prothrombin gene mutation normal  Intermediate risk APLS. Continue indefinite anticoagulation at this time. Will need to hold if hematuria worsens, but hemoglobin stable at this time.  Now found to have Prostate adenocarcinoma- T3a,N0, PSA <10, grade group 2 (jose francisco 3+4=7), overall Stage IIIB NCCN High Risk 2/2 T3a (extraprostatic extension)  S/p prostatectomy at [facility] on 6/19/23.  Post-op PSA was undetectable. He is s/p cystoscopy w/ Dr. Smith - reports he cauterized a bleeding area, which has resolved his hematuria. He remains on xarelto 10mg daily at this time. Clinically NOEL, PSA remains undetectable.  RTC 3 months for PSA check, 6 months for MD visit.  All patient questions answered at today's visit. Patient urged to call the office with any questions or concerns.   I personally have spent a total of 25 minutes of time on the date of this encounter reviewing test results, documenting findings, coordinating care and directly consulting with the patient and/or designated family member. Greater than 50% of the face to face encounter time was spent on counseling and/or coordination of care for prostate cancer.

## 2024-06-18 RX ORDER — RIVAROXABAN 10 MG/1
10 TABLET, FILM COATED ORAL
Qty: 90 | Refills: 1 | Status: ACTIVE | COMMUNITY
Start: 2023-07-31 | End: 1900-01-01

## 2024-08-05 ENCOUNTER — OUTPATIENT (OUTPATIENT)
Dept: OUTPATIENT SERVICES | Facility: HOSPITAL | Age: 64
LOS: 1 days | End: 2024-08-05

## 2024-08-05 DIAGNOSIS — I82.409 ACUTE EMBOLISM AND THROMBOSIS OF UNSPECIFIED DEEP VEINS OF UNSPECIFIED LOWER EXTREMITY: ICD-10-CM

## 2024-08-12 ENCOUNTER — RESULT REVIEW (OUTPATIENT)
Age: 64
End: 2024-08-12

## 2024-08-12 ENCOUNTER — APPOINTMENT (OUTPATIENT)
Dept: HEMATOLOGY ONCOLOGY | Facility: CLINIC | Age: 64
End: 2024-08-12

## 2024-08-12 LAB
BASOPHILS # BLD AUTO: 0.08 K/UL — SIGNIFICANT CHANGE UP (ref 0–0.2)
BASOPHILS NFR BLD AUTO: 1.1 % — SIGNIFICANT CHANGE UP (ref 0–2)
EOSINOPHIL # BLD AUTO: 0.2 K/UL — SIGNIFICANT CHANGE UP (ref 0–0.5)
EOSINOPHIL NFR BLD AUTO: 2.8 % — SIGNIFICANT CHANGE UP (ref 0–6)
HCT VFR BLD CALC: 47.8 % — SIGNIFICANT CHANGE UP (ref 39–50)
HGB BLD-MCNC: 16.9 G/DL — SIGNIFICANT CHANGE UP (ref 13–17)
IMM GRANULOCYTES NFR BLD AUTO: 0.7 % — SIGNIFICANT CHANGE UP (ref 0–0.9)
LYMPHOCYTES # BLD AUTO: 1.22 K/UL — SIGNIFICANT CHANGE UP (ref 1–3.3)
LYMPHOCYTES # BLD AUTO: 17.1 % — SIGNIFICANT CHANGE UP (ref 13–44)
MCHC RBC-ENTMCNC: 31.2 PG — SIGNIFICANT CHANGE UP (ref 27–34)
MCHC RBC-ENTMCNC: 35.4 GM/DL — SIGNIFICANT CHANGE UP (ref 32–36)
MCV RBC AUTO: 88.2 FL — SIGNIFICANT CHANGE UP (ref 80–100)
MONOCYTES # BLD AUTO: 0.64 K/UL — SIGNIFICANT CHANGE UP (ref 0–0.9)
MONOCYTES NFR BLD AUTO: 9 % — SIGNIFICANT CHANGE UP (ref 2–14)
NEUTROPHILS # BLD AUTO: 4.96 K/UL — SIGNIFICANT CHANGE UP (ref 1.8–7.4)
NEUTROPHILS NFR BLD AUTO: 69.3 % — SIGNIFICANT CHANGE UP (ref 43–77)
NRBC # BLD: 0 /100 WBCS — SIGNIFICANT CHANGE UP (ref 0–0)
PLATELET # BLD AUTO: 228 K/UL — SIGNIFICANT CHANGE UP (ref 150–400)
RBC # BLD: 5.42 M/UL — SIGNIFICANT CHANGE UP (ref 4.2–5.8)
RBC # FLD: 13.1 % — SIGNIFICANT CHANGE UP (ref 10.3–14.5)
WBC # BLD: 7.15 K/UL — SIGNIFICANT CHANGE UP (ref 3.8–10.5)
WBC # FLD AUTO: 7.15 K/UL — SIGNIFICANT CHANGE UP (ref 3.8–10.5)

## 2024-08-12 PROCEDURE — 85027 COMPLETE CBC AUTOMATED: CPT

## 2024-10-08 ENCOUNTER — APPOINTMENT (OUTPATIENT)
Dept: NEUROSURGERY | Facility: CLINIC | Age: 64
End: 2024-10-08
Payer: COMMERCIAL

## 2024-10-08 VITALS
HEIGHT: 69 IN | BODY MASS INDEX: 25.48 KG/M2 | OXYGEN SATURATION: 98 % | SYSTOLIC BLOOD PRESSURE: 124 MMHG | DIASTOLIC BLOOD PRESSURE: 74 MMHG | WEIGHT: 172 LBS | HEART RATE: 102 BPM

## 2024-10-08 DIAGNOSIS — M54.50 LOW BACK PAIN, UNSPECIFIED: ICD-10-CM

## 2024-10-08 DIAGNOSIS — M51.369: ICD-10-CM

## 2024-10-08 PROCEDURE — 99213 OFFICE O/P EST LOW 20 MIN: CPT

## 2024-10-08 PROCEDURE — 99203 OFFICE O/P NEW LOW 30 MIN: CPT

## 2024-10-08 RX ORDER — NAPROXEN 500 MG/1
500 TABLET ORAL
Qty: 60 | Refills: 0 | Status: ACTIVE | COMMUNITY
Start: 2024-10-08 | End: 1900-01-01

## 2024-11-13 ENCOUNTER — OUTPATIENT (OUTPATIENT)
Dept: OUTPATIENT SERVICES | Facility: HOSPITAL | Age: 64
LOS: 1 days | End: 2024-11-13
Payer: COMMERCIAL

## 2024-11-13 DIAGNOSIS — I82.409 ACUTE EMBOLISM AND THROMBOSIS OF UNSPECIFIED DEEP VEINS OF UNSPECIFIED LOWER EXTREMITY: ICD-10-CM

## 2024-11-14 ENCOUNTER — RESULT REVIEW (OUTPATIENT)
Age: 64
End: 2024-11-14

## 2024-11-14 ENCOUNTER — APPOINTMENT (OUTPATIENT)
Dept: HEMATOLOGY ONCOLOGY | Facility: CLINIC | Age: 64
End: 2024-11-14

## 2024-11-14 LAB
BASOPHILS # BLD AUTO: 0.08 K/UL — SIGNIFICANT CHANGE UP (ref 0–0.2)
BASOPHILS NFR BLD AUTO: 1.3 % — SIGNIFICANT CHANGE UP (ref 0–2)
EOSINOPHIL # BLD AUTO: 0.25 K/UL — SIGNIFICANT CHANGE UP (ref 0–0.5)
EOSINOPHIL NFR BLD AUTO: 4.1 % — SIGNIFICANT CHANGE UP (ref 0–6)
HCT VFR BLD CALC: 48 % — SIGNIFICANT CHANGE UP (ref 39–50)
HGB BLD-MCNC: 16.6 G/DL — SIGNIFICANT CHANGE UP (ref 13–17)
IMM GRANULOCYTES NFR BLD AUTO: 0.7 % — SIGNIFICANT CHANGE UP (ref 0–0.9)
LYMPHOCYTES # BLD AUTO: 1.64 K/UL — SIGNIFICANT CHANGE UP (ref 1–3.3)
LYMPHOCYTES # BLD AUTO: 27.1 % — SIGNIFICANT CHANGE UP (ref 13–44)
MCHC RBC-ENTMCNC: 31.4 PG — SIGNIFICANT CHANGE UP (ref 27–34)
MCHC RBC-ENTMCNC: 34.6 G/DL — SIGNIFICANT CHANGE UP (ref 32–36)
MCV RBC AUTO: 90.9 FL — SIGNIFICANT CHANGE UP (ref 80–100)
MONOCYTES # BLD AUTO: 0.5 K/UL — SIGNIFICANT CHANGE UP (ref 0–0.9)
MONOCYTES NFR BLD AUTO: 8.3 % — SIGNIFICANT CHANGE UP (ref 2–14)
NEUTROPHILS # BLD AUTO: 3.55 K/UL — SIGNIFICANT CHANGE UP (ref 1.8–7.4)
NEUTROPHILS NFR BLD AUTO: 58.5 % — SIGNIFICANT CHANGE UP (ref 43–77)
NRBC # BLD: 0 /100 WBCS — SIGNIFICANT CHANGE UP (ref 0–0)
NRBC BLD-RTO: 0 /100 WBCS — SIGNIFICANT CHANGE UP (ref 0–0)
PLATELET # BLD AUTO: 238 K/UL — SIGNIFICANT CHANGE UP (ref 150–400)
RBC # BLD: 5.28 M/UL — SIGNIFICANT CHANGE UP (ref 4.2–5.8)
RBC # FLD: 13.2 % — SIGNIFICANT CHANGE UP (ref 10.3–14.5)
WBC # BLD: 6.06 K/UL — SIGNIFICANT CHANGE UP (ref 3.8–10.5)
WBC # FLD AUTO: 6.06 K/UL — SIGNIFICANT CHANGE UP (ref 3.8–10.5)

## 2024-11-14 PROCEDURE — 85027 COMPLETE CBC AUTOMATED: CPT

## 2024-11-15 LAB
ALBUMIN SERPL ELPH-MCNC: 4.4 G/DL
ALP BLD-CCNC: 78 U/L
ALT SERPL-CCNC: 22 U/L
ANION GAP SERPL CALC-SCNC: 10 MMOL/L
AST SERPL-CCNC: 19 U/L
BILIRUB SERPL-MCNC: 0.2 MG/DL
BUN SERPL-MCNC: 19 MG/DL
CALCIUM SERPL-MCNC: 9.5 MG/DL
CHLORIDE SERPL-SCNC: 104 MMOL/L
CO2 SERPL-SCNC: 27 MMOL/L
CREAT SERPL-MCNC: 0.89 MG/DL
EGFR: 96 ML/MIN/1.73M2
GLUCOSE SERPL-MCNC: 107 MG/DL
POTASSIUM SERPL-SCNC: 4.5 MMOL/L
PROT SERPL-MCNC: 6.7 G/DL
PSA FREE FLD-MCNC: NORMAL %
PSA FREE SERPL-MCNC: <0.01 NG/ML
PSA SERPL-MCNC: <0.01 NG/ML
SODIUM SERPL-SCNC: 141 MMOL/L
TESTOST FREE SERPL-MCNC: 6.4 PG/ML
TESTOST SERPL-MCNC: 479 NG/DL

## 2024-11-18 ENCOUNTER — APPOINTMENT (OUTPATIENT)
Dept: UROLOGY | Facility: CLINIC | Age: 64
End: 2024-11-18
Payer: COMMERCIAL

## 2024-11-18 VITALS
SYSTOLIC BLOOD PRESSURE: 126 MMHG | WEIGHT: 172 LBS | BODY MASS INDEX: 25.48 KG/M2 | HEART RATE: 64 BPM | TEMPERATURE: 97.3 F | HEIGHT: 69 IN | DIASTOLIC BLOOD PRESSURE: 66 MMHG

## 2024-11-18 LAB
APPEARANCE: CLEAR
BILIRUBIN URINE: NEGATIVE
BLOOD URINE: NEGATIVE
COLOR: YELLOW
GLUCOSE QUALITATIVE U: NEGATIVE
KETONES URINE: NEGATIVE
LEUKOCYTE ESTERASE URINE: NEGATIVE
NITRITE URINE: NEGATIVE
PH URINE: 5.5
PROTEIN URINE: NEGATIVE
SPECIFIC GRAVITY URINE: 1.01
UROBILINOGEN URINE: 0.2 (ref 0.2–?)

## 2024-11-18 PROCEDURE — 52000 CYSTOURETHROSCOPY: CPT

## 2024-11-21 ENCOUNTER — APPOINTMENT (OUTPATIENT)
Dept: HEMATOLOGY ONCOLOGY | Facility: CLINIC | Age: 64
End: 2024-11-21
Payer: COMMERCIAL

## 2024-11-21 VITALS
OXYGEN SATURATION: 97 % | TEMPERATURE: 97.3 F | WEIGHT: 177 LBS | HEIGHT: 69 IN | BODY MASS INDEX: 26.22 KG/M2 | DIASTOLIC BLOOD PRESSURE: 78 MMHG | SYSTOLIC BLOOD PRESSURE: 118 MMHG | HEART RATE: 77 BPM

## 2024-11-21 DIAGNOSIS — F17.200 NICOTINE DEPENDENCE, UNSPECIFIED, UNCOMPLICATED: ICD-10-CM

## 2024-11-21 DIAGNOSIS — C61 MALIGNANT NEOPLASM OF PROSTATE: ICD-10-CM

## 2024-11-21 DIAGNOSIS — I82.412 ACUTE EMBOLISM AND THROMBOSIS OF LEFT FEMORAL VEIN: ICD-10-CM

## 2024-11-21 PROCEDURE — 99214 OFFICE O/P EST MOD 30 MIN: CPT

## 2024-11-21 PROCEDURE — G2211 COMPLEX E/M VISIT ADD ON: CPT

## 2024-12-02 ENCOUNTER — NON-APPOINTMENT (OUTPATIENT)
Age: 64
End: 2024-12-02

## 2024-12-02 VITALS — WEIGHT: 177 LBS | HEIGHT: 69 IN | BODY MASS INDEX: 26.22 KG/M2

## 2024-12-02 DIAGNOSIS — F17.200 NICOTINE DEPENDENCE, UNSPECIFIED, UNCOMPLICATED: ICD-10-CM

## 2024-12-04 ENCOUNTER — APPOINTMENT (OUTPATIENT)
Dept: CT IMAGING | Facility: CLINIC | Age: 64
End: 2024-12-04
Payer: COMMERCIAL

## 2024-12-04 PROCEDURE — 71271 CT THORAX LUNG CANCER SCR C-: CPT

## 2024-12-10 ENCOUNTER — NON-APPOINTMENT (OUTPATIENT)
Age: 64
End: 2024-12-10

## 2025-02-14 ENCOUNTER — OUTPATIENT (OUTPATIENT)
Dept: OUTPATIENT SERVICES | Facility: HOSPITAL | Age: 65
LOS: 1 days | End: 2025-02-14
Payer: COMMERCIAL

## 2025-02-14 DIAGNOSIS — I82.402 ACUTE EMBOLISM AND THROMBOSIS OF UNSPECIFIED DEEP VEINS OF LEFT LOWER EXTREMITY: ICD-10-CM

## 2025-02-20 DIAGNOSIS — C61 MALIGNANT NEOPLASM OF PROSTATE: ICD-10-CM

## 2025-02-20 DIAGNOSIS — I82.412 ACUTE EMBOLISM AND THROMBOSIS OF LEFT FEMORAL VEIN: ICD-10-CM

## 2025-02-24 ENCOUNTER — APPOINTMENT (OUTPATIENT)
Dept: HEMATOLOGY ONCOLOGY | Facility: CLINIC | Age: 65
End: 2025-02-24

## 2025-02-24 ENCOUNTER — RESULT REVIEW (OUTPATIENT)
Age: 65
End: 2025-02-24

## 2025-02-24 LAB
BASOPHILS # BLD AUTO: 0.09 K/UL — SIGNIFICANT CHANGE UP (ref 0–0.2)
BASOPHILS NFR BLD AUTO: 1.7 % — SIGNIFICANT CHANGE UP (ref 0–2)
EOSINOPHIL # BLD AUTO: 0.3 K/UL — SIGNIFICANT CHANGE UP (ref 0–0.5)
EOSINOPHIL NFR BLD AUTO: 5.7 % — SIGNIFICANT CHANGE UP (ref 0–6)
HCT VFR BLD CALC: 48.2 % — SIGNIFICANT CHANGE UP (ref 39–50)
HGB BLD-MCNC: 16.7 G/DL — SIGNIFICANT CHANGE UP (ref 13–17)
IMM GRANULOCYTES NFR BLD AUTO: 0.6 % — SIGNIFICANT CHANGE UP (ref 0–0.9)
LYMPHOCYTES # BLD AUTO: 1.46 K/UL — SIGNIFICANT CHANGE UP (ref 1–3.3)
LYMPHOCYTES # BLD AUTO: 27.8 % — SIGNIFICANT CHANGE UP (ref 13–44)
MCHC RBC-ENTMCNC: 31.3 PG — SIGNIFICANT CHANGE UP (ref 27–34)
MCHC RBC-ENTMCNC: 34.6 G/DL — SIGNIFICANT CHANGE UP (ref 32–36)
MCV RBC AUTO: 90.4 FL — SIGNIFICANT CHANGE UP (ref 80–100)
MONOCYTES # BLD AUTO: 0.57 K/UL — SIGNIFICANT CHANGE UP (ref 0–0.9)
MONOCYTES NFR BLD AUTO: 10.8 % — SIGNIFICANT CHANGE UP (ref 2–14)
NEUTROPHILS # BLD AUTO: 2.81 K/UL — SIGNIFICANT CHANGE UP (ref 1.8–7.4)
NEUTROPHILS NFR BLD AUTO: 53.4 % — SIGNIFICANT CHANGE UP (ref 43–77)
NRBC BLD AUTO-RTO: 0 /100 WBCS — SIGNIFICANT CHANGE UP (ref 0–0)
PLATELET # BLD AUTO: 217 K/UL — SIGNIFICANT CHANGE UP (ref 150–400)
RBC # BLD: 5.33 M/UL — SIGNIFICANT CHANGE UP (ref 4.2–5.8)
RBC # FLD: 12.6 % — SIGNIFICANT CHANGE UP (ref 10.3–14.5)
WBC # BLD: 5.26 K/UL — SIGNIFICANT CHANGE UP (ref 3.8–10.5)
WBC # FLD AUTO: 5.26 K/UL — SIGNIFICANT CHANGE UP (ref 3.8–10.5)

## 2025-02-24 PROCEDURE — 85027 COMPLETE CBC AUTOMATED: CPT

## 2025-02-25 LAB
ALBUMIN SERPL ELPH-MCNC: 4.3 G/DL
ALP BLD-CCNC: 71 U/L
ALT SERPL-CCNC: 26 U/L
ANION GAP SERPL CALC-SCNC: 11 MMOL/L
AST SERPL-CCNC: 23 U/L
BILIRUB SERPL-MCNC: 0.3 MG/DL
BUN SERPL-MCNC: 15 MG/DL
CALCIUM SERPL-MCNC: 9.3 MG/DL
CHLORIDE SERPL-SCNC: 105 MMOL/L
CO2 SERPL-SCNC: 24 MMOL/L
CREAT SERPL-MCNC: 0.87 MG/DL
EGFR: 97 ML/MIN/1.73M2
GLUCOSE SERPL-MCNC: 108 MG/DL
POTASSIUM SERPL-SCNC: 4.6 MMOL/L
PROT SERPL-MCNC: 6.4 G/DL
PSA FREE FLD-MCNC: NORMAL %
PSA FREE SERPL-MCNC: <0.01 NG/ML
PSA SERPL-MCNC: <0.01 NG/ML
SODIUM SERPL-SCNC: 141 MMOL/L

## 2025-04-21 ENCOUNTER — APPOINTMENT (OUTPATIENT)
Dept: UROLOGY | Facility: CLINIC | Age: 65
End: 2025-04-21
Payer: COMMERCIAL

## 2025-04-21 DIAGNOSIS — N52.1 ERECTILE DYSFUNCTION DUE TO DISEASES CLASSIFIED ELSEWHERE: ICD-10-CM

## 2025-04-21 PROCEDURE — 99214 OFFICE O/P EST MOD 30 MIN: CPT

## 2025-04-21 PROCEDURE — G2211 COMPLEX E/M VISIT ADD ON: CPT | Mod: NC

## 2025-05-01 ENCOUNTER — APPOINTMENT (OUTPATIENT)
Dept: UROLOGY | Facility: CLINIC | Age: 65
End: 2025-05-01
Payer: COMMERCIAL

## 2025-05-01 VITALS
HEIGHT: 69 IN | SYSTOLIC BLOOD PRESSURE: 127 MMHG | WEIGHT: 177 LBS | BODY MASS INDEX: 26.22 KG/M2 | TEMPERATURE: 97.3 F | HEART RATE: 80 BPM | DIASTOLIC BLOOD PRESSURE: 66 MMHG

## 2025-05-01 DIAGNOSIS — N52.9 MALE ERECTILE DYSFUNCTION, UNSPECIFIED: ICD-10-CM

## 2025-05-01 DIAGNOSIS — C61 MALIGNANT NEOPLASM OF PROSTATE: ICD-10-CM

## 2025-05-01 PROCEDURE — 99213 OFFICE O/P EST LOW 20 MIN: CPT

## 2025-05-13 ENCOUNTER — OUTPATIENT (OUTPATIENT)
Dept: OUTPATIENT SERVICES | Facility: HOSPITAL | Age: 65
LOS: 1 days | End: 2025-05-13
Payer: COMMERCIAL

## 2025-05-13 DIAGNOSIS — I82.402 ACUTE EMBOLISM AND THROMBOSIS OF UNSPECIFIED DEEP VEINS OF LEFT LOWER EXTREMITY: ICD-10-CM

## 2025-05-15 ENCOUNTER — RESULT REVIEW (OUTPATIENT)
Age: 65
End: 2025-05-15

## 2025-05-15 ENCOUNTER — APPOINTMENT (OUTPATIENT)
Dept: HEMATOLOGY ONCOLOGY | Facility: CLINIC | Age: 65
End: 2025-05-15

## 2025-05-15 LAB
BASOPHILS # BLD AUTO: 0.07 K/UL — SIGNIFICANT CHANGE UP (ref 0–0.2)
BASOPHILS NFR BLD AUTO: 1.1 % — SIGNIFICANT CHANGE UP (ref 0–2)
EOSINOPHIL # BLD AUTO: 0.41 K/UL — SIGNIFICANT CHANGE UP (ref 0–0.5)
EOSINOPHIL NFR BLD AUTO: 6.4 % — HIGH (ref 0–6)
HCT VFR BLD CALC: 48.9 % — SIGNIFICANT CHANGE UP (ref 39–50)
HGB BLD-MCNC: 17 G/DL — SIGNIFICANT CHANGE UP (ref 13–17)
IMM GRANULOCYTES NFR BLD AUTO: 0.3 % — SIGNIFICANT CHANGE UP (ref 0–0.9)
LYMPHOCYTES # BLD AUTO: 1.73 K/UL — SIGNIFICANT CHANGE UP (ref 1–3.3)
LYMPHOCYTES # BLD AUTO: 26.9 % — SIGNIFICANT CHANGE UP (ref 13–44)
MCHC RBC-ENTMCNC: 31.6 PG — SIGNIFICANT CHANGE UP (ref 27–34)
MCHC RBC-ENTMCNC: 34.8 G/DL — SIGNIFICANT CHANGE UP (ref 32–36)
MCV RBC AUTO: 90.9 FL — SIGNIFICANT CHANGE UP (ref 80–100)
MONOCYTES # BLD AUTO: 0.55 K/UL — SIGNIFICANT CHANGE UP (ref 0–0.9)
MONOCYTES NFR BLD AUTO: 8.6 % — SIGNIFICANT CHANGE UP (ref 2–14)
NEUTROPHILS # BLD AUTO: 3.64 K/UL — SIGNIFICANT CHANGE UP (ref 1.8–7.4)
NEUTROPHILS NFR BLD AUTO: 56.7 % — SIGNIFICANT CHANGE UP (ref 43–77)
NRBC BLD AUTO-RTO: 0 /100 WBCS — SIGNIFICANT CHANGE UP (ref 0–0)
PLATELET # BLD AUTO: 232 K/UL — SIGNIFICANT CHANGE UP (ref 150–400)
RBC # BLD: 5.38 M/UL — SIGNIFICANT CHANGE UP (ref 4.2–5.8)
RBC # FLD: 12.6 % — SIGNIFICANT CHANGE UP (ref 10.3–14.5)
WBC # BLD: 6.42 K/UL — SIGNIFICANT CHANGE UP (ref 3.8–10.5)
WBC # FLD AUTO: 6.42 K/UL — SIGNIFICANT CHANGE UP (ref 3.8–10.5)

## 2025-05-15 PROCEDURE — 85027 COMPLETE CBC AUTOMATED: CPT

## 2025-05-16 LAB
ALBUMIN SERPL ELPH-MCNC: 4.3 G/DL
ALP BLD-CCNC: 71 U/L
ALT SERPL-CCNC: 27 U/L
ANION GAP SERPL CALC-SCNC: 13 MMOL/L
AST SERPL-CCNC: 25 U/L
BILIRUB SERPL-MCNC: 0.4 MG/DL
BUN SERPL-MCNC: 18 MG/DL
CALCIUM SERPL-MCNC: 9.6 MG/DL
CHLORIDE SERPL-SCNC: 107 MMOL/L
CO2 SERPL-SCNC: 22 MMOL/L
CREAT SERPL-MCNC: 0.86 MG/DL
EGFRCR SERPLBLD CKD-EPI 2021: 97 ML/MIN/1.73M2
GLUCOSE SERPL-MCNC: 111 MG/DL
POTASSIUM SERPL-SCNC: 4.5 MMOL/L
PROT SERPL-MCNC: 6.4 G/DL
PSA FREE FLD-MCNC: NORMAL %
PSA FREE SERPL-MCNC: <0.01 NG/ML
PSA SERPL-MCNC: <0.01 NG/ML
SODIUM SERPL-SCNC: 142 MMOL/L

## 2025-05-22 ENCOUNTER — NON-APPOINTMENT (OUTPATIENT)
Age: 65
End: 2025-05-22

## 2025-05-22 ENCOUNTER — APPOINTMENT (OUTPATIENT)
Dept: HEMATOLOGY ONCOLOGY | Facility: CLINIC | Age: 65
End: 2025-05-22
Payer: COMMERCIAL

## 2025-05-22 ENCOUNTER — APPOINTMENT (OUTPATIENT)
Dept: ULTRASOUND IMAGING | Facility: CLINIC | Age: 65
End: 2025-05-22
Payer: COMMERCIAL

## 2025-05-22 ENCOUNTER — RESULT REVIEW (OUTPATIENT)
Age: 65
End: 2025-05-22

## 2025-05-22 VITALS
TEMPERATURE: 98.6 F | HEIGHT: 69 IN | OXYGEN SATURATION: 98 % | BODY MASS INDEX: 26.01 KG/M2 | WEIGHT: 175.6 LBS | DIASTOLIC BLOOD PRESSURE: 79 MMHG | HEART RATE: 78 BPM | SYSTOLIC BLOOD PRESSURE: 131 MMHG

## 2025-05-22 DIAGNOSIS — I82.412 ACUTE EMBOLISM AND THROMBOSIS OF LEFT FEMORAL VEIN: ICD-10-CM

## 2025-05-22 DIAGNOSIS — C61 MALIGNANT NEOPLASM OF PROSTATE: ICD-10-CM

## 2025-05-22 PROCEDURE — 99214 OFFICE O/P EST MOD 30 MIN: CPT

## 2025-05-22 PROCEDURE — G2211 COMPLEX E/M VISIT ADD ON: CPT | Mod: NC

## 2025-05-22 PROCEDURE — 93971 EXTREMITY STUDY: CPT | Mod: LT

## 2025-06-12 ENCOUNTER — APPOINTMENT (OUTPATIENT)
Dept: OPHTHALMOLOGY | Facility: CLINIC | Age: 65
End: 2025-06-12
Payer: COMMERCIAL

## 2025-06-12 ENCOUNTER — NON-APPOINTMENT (OUTPATIENT)
Age: 65
End: 2025-06-12

## 2025-06-12 PROCEDURE — 92004 COMPRE OPH EXAM NEW PT 1/>: CPT

## 2025-08-01 ENCOUNTER — APPOINTMENT (OUTPATIENT)
Dept: ULTRASOUND IMAGING | Facility: CLINIC | Age: 65
End: 2025-08-01
Payer: COMMERCIAL

## 2025-08-01 PROCEDURE — 93880 EXTRACRANIAL BILAT STUDY: CPT

## 2025-08-22 ENCOUNTER — RESULT REVIEW (OUTPATIENT)
Age: 65
End: 2025-08-22

## 2025-08-22 ENCOUNTER — APPOINTMENT (OUTPATIENT)
Dept: HEMATOLOGY ONCOLOGY | Facility: CLINIC | Age: 65
End: 2025-08-22

## 2025-08-25 ENCOUNTER — APPOINTMENT (OUTPATIENT)
Dept: HEMATOLOGY ONCOLOGY | Facility: CLINIC | Age: 65
End: 2025-08-25
Payer: COMMERCIAL

## 2025-08-25 VITALS
DIASTOLIC BLOOD PRESSURE: 77 MMHG | TEMPERATURE: 98.2 F | SYSTOLIC BLOOD PRESSURE: 111 MMHG | WEIGHT: 174 LBS | HEART RATE: 82 BPM | HEIGHT: 69 IN | OXYGEN SATURATION: 97 % | BODY MASS INDEX: 25.77 KG/M2

## 2025-08-25 DIAGNOSIS — C61 MALIGNANT NEOPLASM OF PROSTATE: ICD-10-CM

## 2025-08-25 DIAGNOSIS — I82.412 ACUTE EMBOLISM AND THROMBOSIS OF LEFT FEMORAL VEIN: ICD-10-CM

## 2025-08-25 LAB
ALBUMIN SERPL ELPH-MCNC: 4.4 G/DL
ALP BLD-CCNC: 72 U/L
ALT SERPL-CCNC: 32 U/L
ANION GAP SERPL CALC-SCNC: 14 MMOL/L
AST SERPL-CCNC: 22 U/L
BILIRUB SERPL-MCNC: 0.2 MG/DL
BUN SERPL-MCNC: 15 MG/DL
CALCIUM SERPL-MCNC: 9.3 MG/DL
CHLORIDE SERPL-SCNC: 104 MMOL/L
CO2 SERPL-SCNC: 21 MMOL/L
CREAT SERPL-MCNC: 0.8 MG/DL
EGFRCR SERPLBLD CKD-EPI 2021: 99 ML/MIN/1.73M2
GLUCOSE SERPL-MCNC: 107 MG/DL
POTASSIUM SERPL-SCNC: 4.4 MMOL/L
PROT SERPL-MCNC: 6.4 G/DL
PSA FREE FLD-MCNC: NORMAL %
PSA FREE SERPL-MCNC: <0.01 NG/ML
PSA SERPL-MCNC: <0.01 NG/ML
SODIUM SERPL-SCNC: 139 MMOL/L

## 2025-08-25 PROCEDURE — 99214 OFFICE O/P EST MOD 30 MIN: CPT

## 2025-08-28 ENCOUNTER — APPOINTMENT (OUTPATIENT)
Dept: OPHTHALMOLOGY | Facility: CLINIC | Age: 65
End: 2025-08-28